# Patient Record
Sex: FEMALE | Race: OTHER | HISPANIC OR LATINO | URBAN - METROPOLITAN AREA
[De-identification: names, ages, dates, MRNs, and addresses within clinical notes are randomized per-mention and may not be internally consistent; named-entity substitution may affect disease eponyms.]

---

## 2018-03-12 ENCOUNTER — INPATIENT (INPATIENT)
Facility: HOSPITAL | Age: 83
LOS: 7 days | Discharge: HOME | DRG: 375 | End: 2018-03-20
Attending: INTERNAL MEDICINE | Admitting: INTERNAL MEDICINE
Payer: MEDICARE

## 2018-03-12 VITALS
OXYGEN SATURATION: 98 % | RESPIRATION RATE: 18 BRPM | HEART RATE: 96 BPM | TEMPERATURE: 98 F | DIASTOLIC BLOOD PRESSURE: 77 MMHG | HEIGHT: 55 IN | SYSTOLIC BLOOD PRESSURE: 144 MMHG | WEIGHT: 106.04 LBS

## 2018-03-12 DIAGNOSIS — R13.10 DYSPHAGIA, UNSPECIFIED: ICD-10-CM

## 2018-03-12 DIAGNOSIS — Z98.890 OTHER SPECIFIED POSTPROCEDURAL STATES: Chronic | ICD-10-CM

## 2018-03-12 LAB
ALBUMIN SERPL ELPH-MCNC: 4 G/DL — SIGNIFICANT CHANGE UP (ref 3.3–5)
ALP SERPL-CCNC: 65 U/L — SIGNIFICANT CHANGE UP (ref 40–120)
ALT FLD-CCNC: 17 U/L RC — SIGNIFICANT CHANGE UP (ref 10–45)
ANION GAP SERPL CALC-SCNC: 15 MMOL/L — SIGNIFICANT CHANGE UP (ref 5–17)
APTT BLD: 30.6 SEC — SIGNIFICANT CHANGE UP (ref 27.5–37.4)
AST SERPL-CCNC: 27 U/L — SIGNIFICANT CHANGE UP (ref 10–40)
BASOPHILS # BLD AUTO: 0.1 K/UL — SIGNIFICANT CHANGE UP (ref 0–0.2)
BASOPHILS NFR BLD AUTO: 0.8 % — SIGNIFICANT CHANGE UP (ref 0–2)
BILIRUB SERPL-MCNC: 0.7 MG/DL — SIGNIFICANT CHANGE UP (ref 0.2–1.2)
BLD GP AB SCN SERPL QL: NEGATIVE — SIGNIFICANT CHANGE UP
BUN SERPL-MCNC: 16 MG/DL — SIGNIFICANT CHANGE UP (ref 7–23)
CALCIUM SERPL-MCNC: 9.4 MG/DL — SIGNIFICANT CHANGE UP (ref 8.4–10.5)
CHLORIDE SERPL-SCNC: 105 MMOL/L — SIGNIFICANT CHANGE UP (ref 96–108)
CO2 SERPL-SCNC: 24 MMOL/L — SIGNIFICANT CHANGE UP (ref 22–31)
CREAT SERPL-MCNC: 0.71 MG/DL — SIGNIFICANT CHANGE UP (ref 0.5–1.3)
EOSINOPHIL # BLD AUTO: 0.1 K/UL — SIGNIFICANT CHANGE UP (ref 0–0.5)
EOSINOPHIL NFR BLD AUTO: 1.1 % — SIGNIFICANT CHANGE UP (ref 0–6)
GLUCOSE SERPL-MCNC: 90 MG/DL — SIGNIFICANT CHANGE UP (ref 70–99)
HCT VFR BLD CALC: 40.7 % — SIGNIFICANT CHANGE UP (ref 34.5–45)
HGB BLD-MCNC: 14.1 G/DL — SIGNIFICANT CHANGE UP (ref 11.5–15.5)
INR BLD: 1.06 RATIO — SIGNIFICANT CHANGE UP (ref 0.88–1.16)
LYMPHOCYTES # BLD AUTO: 1.5 K/UL — SIGNIFICANT CHANGE UP (ref 1–3.3)
LYMPHOCYTES # BLD AUTO: 17.9 % — SIGNIFICANT CHANGE UP (ref 13–44)
MCHC RBC-ENTMCNC: 32.8 PG — SIGNIFICANT CHANGE UP (ref 27–34)
MCHC RBC-ENTMCNC: 34.5 GM/DL — SIGNIFICANT CHANGE UP (ref 32–36)
MCV RBC AUTO: 95 FL — SIGNIFICANT CHANGE UP (ref 80–100)
MONOCYTES # BLD AUTO: 0.5 K/UL — SIGNIFICANT CHANGE UP (ref 0–0.9)
MONOCYTES NFR BLD AUTO: 5.9 % — SIGNIFICANT CHANGE UP (ref 2–14)
NEUTROPHILS # BLD AUTO: 6.2 K/UL — SIGNIFICANT CHANGE UP (ref 1.8–7.4)
NEUTROPHILS NFR BLD AUTO: 74.3 % — SIGNIFICANT CHANGE UP (ref 43–77)
PLATELET # BLD AUTO: 268 K/UL — SIGNIFICANT CHANGE UP (ref 150–400)
POTASSIUM SERPL-MCNC: 3.6 MMOL/L — SIGNIFICANT CHANGE UP (ref 3.5–5.3)
POTASSIUM SERPL-SCNC: 3.6 MMOL/L — SIGNIFICANT CHANGE UP (ref 3.5–5.3)
PROT SERPL-MCNC: 7.3 G/DL — SIGNIFICANT CHANGE UP (ref 6–8.3)
PROTHROM AB SERPL-ACNC: 11.6 SEC — SIGNIFICANT CHANGE UP (ref 9.8–12.7)
RBC # BLD: 4.29 M/UL — SIGNIFICANT CHANGE UP (ref 3.8–5.2)
RBC # FLD: 11.7 % — SIGNIFICANT CHANGE UP (ref 10.3–14.5)
RH IG SCN BLD-IMP: POSITIVE — SIGNIFICANT CHANGE UP
RH IG SCN BLD-IMP: POSITIVE — SIGNIFICANT CHANGE UP
SODIUM SERPL-SCNC: 144 MMOL/L — SIGNIFICANT CHANGE UP (ref 135–145)
WBC # BLD: 8.4 K/UL — SIGNIFICANT CHANGE UP (ref 3.8–10.5)
WBC # FLD AUTO: 8.4 K/UL — SIGNIFICANT CHANGE UP (ref 3.8–10.5)

## 2018-03-12 PROCEDURE — 99285 EMERGENCY DEPT VISIT HI MDM: CPT

## 2018-03-12 RX ORDER — HEPARIN SODIUM 5000 [USP'U]/ML
5000 INJECTION INTRAVENOUS; SUBCUTANEOUS EVERY 12 HOURS
Qty: 0 | Refills: 0 | Status: DISCONTINUED | OUTPATIENT
Start: 2018-03-12 | End: 2018-03-20

## 2018-03-12 RX ORDER — AMLODIPINE BESYLATE 2.5 MG/1
5 TABLET ORAL DAILY
Qty: 0 | Refills: 0 | Status: DISCONTINUED | OUTPATIENT
Start: 2018-03-12 | End: 2018-03-14

## 2018-03-12 RX ORDER — AMLODIPINE BESYLATE 2.5 MG/1
1 TABLET ORAL
Qty: 0 | Refills: 0 | COMMUNITY

## 2018-03-12 RX ORDER — AMLODIPINE BESYLATE AND BENAZEPRIL HYDROCHLORIDE 10; 20 MG/1; MG/1
1 CAPSULE ORAL
Qty: 0 | Refills: 0 | COMMUNITY

## 2018-03-12 RX ORDER — LOSARTAN POTASSIUM 100 MG/1
100 TABLET, FILM COATED ORAL DAILY
Qty: 0 | Refills: 0 | Status: DISCONTINUED | OUTPATIENT
Start: 2018-03-12 | End: 2018-03-20

## 2018-03-12 RX ORDER — AMLODIPINE BESYLATE 2.5 MG/1
0 TABLET ORAL
Qty: 0 | Refills: 0 | COMMUNITY

## 2018-03-12 RX ORDER — LEVOTHYROXINE SODIUM 125 MCG
1 TABLET ORAL
Qty: 0 | Refills: 0 | COMMUNITY

## 2018-03-12 RX ORDER — HYDROCHLOROTHIAZIDE 25 MG
12.5 TABLET ORAL DAILY
Qty: 0 | Refills: 0 | Status: DISCONTINUED | OUTPATIENT
Start: 2018-03-12 | End: 2018-03-20

## 2018-03-12 RX ORDER — LATANOPROST 0.05 MG/ML
1 SOLUTION/ DROPS OPHTHALMIC; TOPICAL
Qty: 0 | Refills: 0 | COMMUNITY

## 2018-03-12 RX ORDER — PANTOPRAZOLE SODIUM 20 MG/1
40 TABLET, DELAYED RELEASE ORAL DAILY
Qty: 0 | Refills: 0 | Status: DISCONTINUED | OUTPATIENT
Start: 2018-03-12 | End: 2018-03-17

## 2018-03-12 RX ORDER — OMEPRAZOLE 10 MG/1
1 CAPSULE, DELAYED RELEASE ORAL
Qty: 0 | Refills: 0 | COMMUNITY

## 2018-03-12 RX ORDER — LISINOPRIL 2.5 MG/1
20 TABLET ORAL DAILY
Qty: 0 | Refills: 0 | Status: DISCONTINUED | OUTPATIENT
Start: 2018-03-12 | End: 2018-03-14

## 2018-03-12 RX ORDER — LEVOTHYROXINE SODIUM 125 MCG
0 TABLET ORAL
Qty: 0 | Refills: 0 | COMMUNITY

## 2018-03-12 RX ORDER — LATANOPROST 0.05 MG/ML
1 SOLUTION/ DROPS OPHTHALMIC; TOPICAL AT BEDTIME
Qty: 0 | Refills: 0 | Status: DISCONTINUED | OUTPATIENT
Start: 2018-03-12 | End: 2018-03-20

## 2018-03-12 RX ORDER — LEVOTHYROXINE SODIUM 125 MCG
75 TABLET ORAL DAILY
Qty: 0 | Refills: 0 | Status: DISCONTINUED | OUTPATIENT
Start: 2018-03-12 | End: 2018-03-20

## 2018-03-12 NOTE — ED ADULT NURSE REASSESSMENT NOTE - NS ED NURSE REASSESS COMMENT FT1
Received report from previous shift RN. Patient resting comfortably in bed with family at bedside. Pt made aware of plan for admission and NPO status. Denies pain or discomfort at this time. VSS. Awaiting bed assignment

## 2018-03-12 NOTE — H&P ADULT - NSHPLABSRESULTS_GEN_ALL_CORE
LABS:                        14.1   8.4   )-----------( 268      ( 12 Mar 2018 15:25 )             40.7     03-12    144  |  105  |  16  ----------------------------<  90  3.6   |  24  |  0.71    Ca    9.4      12 Mar 2018 15:25    TPro  7.3  /  Alb  4.0  /  TBili  0.7  /  DBili  x   /  AST  27  /  ALT  17  /  AlkPhos  65  03-12    PT/INR - ( 12 Mar 2018 15:25 )   PT: 11.6 sec;   INR: 1.06 ratio         PTT - ( 12 Mar 2018 15:25 )  PTT:30.6 sec          RADIOLOGY & ADDITIONAL TESTS:

## 2018-03-12 NOTE — ED ADULT TRIAGE NOTE - CHIEF COMPLAINT QUOTE
oliva been having trouble swallowing for the last 3 weeks; unable to have an endoscopy; scope too big

## 2018-03-12 NOTE — CONSULT NOTE ADULT - ASSESSMENT
89 yo woman with h/o hypothyroid and GERD p/w 2 weeks of progressive dysphagia and 3/2/18 barium swallow at outside facility suggesting achalasia and no masses identified.     - No c/f obstruction with need for emergent EGD at this time.   - Will possibly need esophageal manometry  - Keep NPO pending further workup  - IVF while NPO    Will discuss with fellow and attending.      Chris Harmon MD  PGY-1 | Internal Medicine 87 yo woman with h/o hypothyroid and GERD p/w 2 weeks of progressive dysphagia and 3/2/18 barium swallow at outside facility suggesting achalasia and no masses identified.     - No c/f obstruction with need for emergent EGD at this time.   - Will possibly need esophageal manometry  - Keep NPO pending further workup  - IVF while NPO  - c/w PPI for GERD    Will discuss with fellow and attending.      Chris Harmon MD  PGY-1 | Internal Medicine 87 yo woman with h/o hypothyroid and GERD p/w 2 weeks of progressive dysphagia and 3/2/18 barium swallow at outside facility suggesting achalasia and no masses identified.     - No c/f obstruction with need for emergent EGD at this time.   - Plan for EGD tomorrow  - NPO at midnight, diet as tolerated until then  - c/w PPI     Will discuss with fellow and attending.      Chris Harmon MD  PGY-1 | Internal Medicine 89 yo woman with h/o hypothyroid and GERD p/w 2 weeks of progressive dysphagia and 3/2/18 barium swallow at outside facility suggesting achalasia and no masses identified.     - No c/f obstruction with need for emergent EGD at this time.   - Plan for EGD tomorrow  - please keep patient NPO   - c/w PPI     Will discuss with fellow and attending.      Chris Harmon MD  PGY-1 | Internal Medicine

## 2018-03-12 NOTE — ED PROVIDER NOTE - ATTENDING CONTRIBUTION TO CARE
Patient presenting with progressive dysphagia over past three weeks, now unable to tolerate PO, 10lb weight loss in last week, saw outpatient GI who had outpatient endoscopy with esophageal narrowing and unable to pass scope, sent by GI for admission for further care, no other complaints, no associated pains.    On exam patient well appearing, vital signs within normal limits, RRR S1/S2, lungs clear to ascultation bilaterally, abdomen soft, non tender, non distended.    Discussed with her GI, already contacted house GI service, patient with progressive dysphagia and outpatient esophageal narrowing, ? achalasia versus stricture with inability to tolerated PO, plan for labs, IVF, GI consultation, admission for further management.

## 2018-03-12 NOTE — ED ADULT NURSE NOTE - OBJECTIVE STATEMENT
89 y/o female  sent in by her gastroenterologist for ongoing difficulty swallowing and failure to thrive due to "narrowing" of the esophagus.  Pt states  EGD not obtained due to severe narrowing.  Pt reports she lost about 10 pounds of unintentional weight loss over the last 1 month.  Pt states she is able to swallow solids and liquids but often "choking" with non bloody phlegm.  Pt denies fever, chills, chest pain, SOB, nausea, vomiting.

## 2018-03-12 NOTE — ED PROVIDER NOTE - OBJECTIVE STATEMENT
89 yo F sent in by her gastroenterologist (Dr. Waldrop) for ongoing difficulty swallowing and failure to thrive secondary to "narrowing" of the esophagus. Full EGD was unable to be obtained secondary to severity of narrowing. Pt reports about 10 pounds of unintentional weight loss over the last 1 month. Able to swallow solids and liquids but often "choking" with copious amount of non bloody phlegm and does not believe she is able to adequately eat or hydrate.

## 2018-03-12 NOTE — CONSULT NOTE ADULT - SUBJECTIVE AND OBJECTIVE BOX
PATIENT: MARTINA MOON   AGE: 87yo   GENDER: Female  WEIGHT: Height (cm): 139.7 (03-12-18 @ 13:24)  Weight (kg): 48.1 (03-12-18 @ 13:24)  BMI (kg/m2): 24.6 (03-12-18 @ 13:24)  BSA (m2): 1.34 (03-12-18 @ 13:24)  ALLERGIES: No Known Allergies    CHIEF COMPLAINT:   Dysphagia    GI CONSULT NOTE:   89 yo woman with h/o hypothyroid and GERD p/w 2 weeks of progressive dysphagia. She reports dysphagia began with solids a/w epigastric pain and has progressed to liquids. She denies previous episodes or h/o GI dz. She denies recent F/C, N/V, SOB, CP not a/w eating, abd pain, diarrhea, hematochezia, melena. 3/2/18 barium swallow at outside facility was significant for delayed emptying of the esophagus with smooth tapering distally, suggesting achalasia; no masses identified.      MEDICATIONS      VITAL SIGNS (last 24 hrs):  ICU Vital Signs Last 24 Hrs  T(C): 36.9 (12 Mar 2018 13:24), Max: 36.9 (12 Mar 2018 13:24)  T(F): 98.5 (12 Mar 2018 13:24), Max: 98.5 (12 Mar 2018 13:24)  HR: 95 (12 Mar 2018 14:10) (95 - 96)  BP: 130/64 (12 Mar 2018 14:10) (130/64 - 144/77)  BP(mean): --  ABP: --  ABP(mean): --  RR: 18 (12 Mar 2018 14:10) (18 - 18)  SpO2: 97% (12 Mar 2018 14:10) (97% - 98%)    T(C): 36.9 (03-12-18 @ 13:24), Max: 36.9 (03-12-18 @ 13:24)  T(F): 98.5 (03-12-18 @ 13:24), Max: 98.5 (03-12-18 @ 13:24)  HR: 95 (03-12-18 @ 14:10) (95 - 96)  BP: 130/64 (03-12-18 @ 14:10) (130/64 - 144/77)  BP(mean): --  RR: 18 (03-12-18 @ 14:10) (18 - 18)  SpO2: 97% (03-12-18 @ 14:10) (97% - 98%)    PHYSICAL EXAM:  GENERAL: NAD  HEENT:  No oropharyngeal erythema or exudates  NECK: Supple, non tender, no masses appreciated  CHEST/LUNG: CTAB, no wheezes, ronchi, rales  HEART: Normal rate, regular rhythm; No murmurs, rubs, or gallops; No JVD or peripheral edema  ABDOMEN: soft, NTTP, nondistended, normoactive BS  MSK/EXTREMITIES:  Grossly normal strength, movement, tone, and ROM; Palpable peripheral pulses; No clubbing or cyanosis  PSYCH: AAOx4  NEUROLOGY: Non focal   SKIN: No rashes or lesions      LABS:                         14.1   8.4   >-----< 268           ( 03-12-18 @ 15:25 )             40.7                    I/O SUMMARY:      MICROBIOLOGY:      RADIOLOGY & ADDITIONAL TESTS: PATIENT: MARTINA MOON   AGE: 87yo   GENDER: Female  WEIGHT: Height (cm): 139.7 (03-12-18 @ 13:24)  Weight (kg): 48.1 (03-12-18 @ 13:24)  BMI (kg/m2): 24.6 (03-12-18 @ 13:24)  BSA (m2): 1.34 (03-12-18 @ 13:24)  ALLERGIES: No Known Allergies    CHIEF COMPLAINT:   Dysphagia    GI CONSULT NOTE:   87 yo woman with h/o HTN, hypothyroid, and GERD p/w 2 weeks of progressive dysphagia. She reports dysphagia began with solids a/w epigastric pain and has progressed to liquids. She denies previous episodes or h/o GI dz. She denies recent F/C, N/V, SOB, CP not a/w eating, abd pain, diarrhea, hematochezia, melena. 3/2/18 barium swallow at outside facility was significant for delayed emptying of the esophagus with smooth tapering distally, suggesting achalasia; no masses identified.      MEDICATIONS      VITAL SIGNS (last 24 hrs):  ICU Vital Signs Last 24 Hrs  T(C): 36.9 (12 Mar 2018 13:24), Max: 36.9 (12 Mar 2018 13:24)  T(F): 98.5 (12 Mar 2018 13:24), Max: 98.5 (12 Mar 2018 13:24)  HR: 95 (12 Mar 2018 14:10) (95 - 96)  BP: 130/64 (12 Mar 2018 14:10) (130/64 - 144/77)  BP(mean): --  ABP: --  ABP(mean): --  RR: 18 (12 Mar 2018 14:10) (18 - 18)  SpO2: 97% (12 Mar 2018 14:10) (97% - 98%)    T(C): 36.9 (03-12-18 @ 13:24), Max: 36.9 (03-12-18 @ 13:24)  T(F): 98.5 (03-12-18 @ 13:24), Max: 98.5 (03-12-18 @ 13:24)  HR: 95 (03-12-18 @ 14:10) (95 - 96)  BP: 130/64 (03-12-18 @ 14:10) (130/64 - 144/77)  BP(mean): --  RR: 18 (03-12-18 @ 14:10) (18 - 18)  SpO2: 97% (03-12-18 @ 14:10) (97% - 98%)    PHYSICAL EXAM:  GENERAL: NAD  HEENT:  No oropharyngeal erythema or exudates  NECK: Supple, non tender, no masses appreciated  CHEST/LUNG: CTAB, no wheezes, ronchi, rales  HEART: Normal rate, regular rhythm; No murmurs, rubs, or gallops; No JVD or peripheral edema  ABDOMEN: soft, NTTP, nondistended, normoactive BS  MSK/EXTREMITIES:  Grossly normal strength, movement, tone, and ROM; Palpable peripheral pulses; No clubbing or cyanosis  PSYCH: AAOx4  NEUROLOGY: Non focal   SKIN: No rashes or lesions      LABS:                         14.1   8.4   >-----< 268           ( 03-12-18 @ 15:25 )             40.7                    I/O SUMMARY:      MICROBIOLOGY:      RADIOLOGY & ADDITIONAL TESTS: PATIENT: MARTINA MOON   AGE: 87yo   GENDER: Female  WEIGHT: Height (cm): 139.7 (03-12-18 @ 13:24)  Weight (kg): 48.1 (03-12-18 @ 13:24)  BMI (kg/m2): 24.6 (03-12-18 @ 13:24)  BSA (m2): 1.34 (03-12-18 @ 13:24)  ALLERGIES: No Known Allergies    CHIEF COMPLAINT:   Dysphagia    GI CONSULT NOTE:   87 yo woman with h/o HTN, hypothyroid, and GERD p/w 2 weeks of progressive dysphagia. She reports dysphagia began with solids a/w epigastric pain and has progressed to liquids. She denies saliva accumulation or aspirating. She also denies previous episodes or h/o GI dz. She denies recent F/C, N/V, SOB, CP not a/w eating, abd pain, diarrhea, hematochezia, melena. 3/2/18 barium swallow at outside facility was significant for delayed emptying of the esophagus with smooth tapering distally, suggesting achalasia; no masses identified.      MEDICATIONS      VITAL SIGNS (last 24 hrs):  ICU Vital Signs Last 24 Hrs  T(C): 36.9 (12 Mar 2018 13:24), Max: 36.9 (12 Mar 2018 13:24)  T(F): 98.5 (12 Mar 2018 13:24), Max: 98.5 (12 Mar 2018 13:24)  HR: 95 (12 Mar 2018 14:10) (95 - 96)  BP: 130/64 (12 Mar 2018 14:10) (130/64 - 144/77)  BP(mean): --  ABP: --  ABP(mean): --  RR: 18 (12 Mar 2018 14:10) (18 - 18)  SpO2: 97% (12 Mar 2018 14:10) (97% - 98%)    T(C): 36.9 (03-12-18 @ 13:24), Max: 36.9 (03-12-18 @ 13:24)  T(F): 98.5 (03-12-18 @ 13:24), Max: 98.5 (03-12-18 @ 13:24)  HR: 95 (03-12-18 @ 14:10) (95 - 96)  BP: 130/64 (03-12-18 @ 14:10) (130/64 - 144/77)  BP(mean): --  RR: 18 (03-12-18 @ 14:10) (18 - 18)  SpO2: 97% (03-12-18 @ 14:10) (97% - 98%)    PHYSICAL EXAM:  GENERAL: NAD  HEENT:  No oropharyngeal erythema or exudates  NECK: Supple, non tender, no masses appreciated  CHEST/LUNG: CTAB, no wheezes, ronchi, rales  HEART: Normal rate, regular rhythm; No murmurs, rubs, or gallops; No JVD or peripheral edema  ABDOMEN: soft, NTTP, nondistended, normoactive BS  MSK/EXTREMITIES:  Grossly normal strength, movement, tone, and ROM; Palpable peripheral pulses; No clubbing or cyanosis  PSYCH: AAOx4  NEUROLOGY: Non focal   SKIN: No rashes or lesions      LABS:                         14.1   8.4   >-----< 268           ( 03-12-18 @ 15:25 )             40.7                    I/O SUMMARY:      MICROBIOLOGY:      RADIOLOGY & ADDITIONAL TESTS:

## 2018-03-12 NOTE — H&P ADULT - ASSESSMENT
89 yo F sent in by her gastroenterologist (Dr. Waldrop) for ongoing difficulty swallowing and failure to thrive secondary to "narrowing" of the esophagus. Full EGD was unable to be obtained secondary to severity of narrowing. Pt reports about 10 pounds of unintentional weight loss over the last 1 month. Able to swallow solids and liquids but often "choking" with copious amount of non bloody phlegm and does not believe she is able to adequately eat or hydrate. 87 yo F sent in by her gastroenterologist (Dr. Waldrop) for ongoing difficulty swallowing and failure to thrive secondary to "narrowing" of the esophagus. Full EGD was unable to be obtained secondary to severity of narrowing. Pt reports about 10 pounds of unintentional weight loss over the last 1 month. Able to swallow solids and liquids but often "choking" with copious amount of non bloody phlegm and does not believe she is able to adequately eat or hydrate.    dysphagia with esophageal tricture - keep npo, iv ppi, gi consult for egd    htn - c/w home meds    hypothyroid - c/w synthroid , check tsh

## 2018-03-13 ENCOUNTER — RESULT REVIEW (OUTPATIENT)
Age: 83
End: 2018-03-13

## 2018-03-13 PROBLEM — Z00.00 ENCOUNTER FOR PREVENTIVE HEALTH EXAMINATION: Status: ACTIVE | Noted: 2018-03-13

## 2018-03-13 LAB
ANION GAP SERPL CALC-SCNC: 16 MMOL/L — SIGNIFICANT CHANGE UP (ref 5–17)
BUN SERPL-MCNC: 14 MG/DL — SIGNIFICANT CHANGE UP (ref 7–23)
CALCIUM SERPL-MCNC: 8.9 MG/DL — SIGNIFICANT CHANGE UP (ref 8.4–10.5)
CHLORIDE SERPL-SCNC: 105 MMOL/L — SIGNIFICANT CHANGE UP (ref 96–108)
CO2 SERPL-SCNC: 22 MMOL/L — SIGNIFICANT CHANGE UP (ref 22–31)
CREAT SERPL-MCNC: 0.61 MG/DL — SIGNIFICANT CHANGE UP (ref 0.5–1.3)
FOLATE SERPL-MCNC: >20 NG/ML — SIGNIFICANT CHANGE UP (ref 4.8–24.2)
GLUCOSE BLDC GLUCOMTR-MCNC: 114 MG/DL — HIGH (ref 70–99)
GLUCOSE BLDC GLUCOMTR-MCNC: 228 MG/DL — HIGH (ref 70–99)
GLUCOSE BLDC GLUCOMTR-MCNC: 59 MG/DL — LOW (ref 70–99)
GLUCOSE BLDC GLUCOMTR-MCNC: 65 MG/DL — LOW (ref 70–99)
GLUCOSE BLDC GLUCOMTR-MCNC: 68 MG/DL — LOW (ref 70–99)
GLUCOSE BLDC GLUCOMTR-MCNC: 70 MG/DL — SIGNIFICANT CHANGE UP (ref 70–99)
GLUCOSE SERPL-MCNC: 66 MG/DL — LOW (ref 70–99)
HCT VFR BLD CALC: 38.1 % — SIGNIFICANT CHANGE UP (ref 34.5–45)
HGB BLD-MCNC: 12.9 G/DL — SIGNIFICANT CHANGE UP (ref 11.5–15.5)
MAGNESIUM SERPL-MCNC: 1.6 MG/DL — SIGNIFICANT CHANGE UP (ref 1.6–2.6)
MCHC RBC-ENTMCNC: 31.5 PG — SIGNIFICANT CHANGE UP (ref 27–34)
MCHC RBC-ENTMCNC: 33.9 GM/DL — SIGNIFICANT CHANGE UP (ref 32–36)
MCV RBC AUTO: 93.2 FL — SIGNIFICANT CHANGE UP (ref 80–100)
NRBC # BLD: 0 /100 WBCS — SIGNIFICANT CHANGE UP (ref 0–0)
PHOSPHATE SERPL-MCNC: 2.7 MG/DL — SIGNIFICANT CHANGE UP (ref 2.5–4.5)
PLATELET # BLD AUTO: 261 K/UL — SIGNIFICANT CHANGE UP (ref 150–400)
POTASSIUM SERPL-MCNC: 3.4 MMOL/L — LOW (ref 3.5–5.3)
POTASSIUM SERPL-SCNC: 3.4 MMOL/L — LOW (ref 3.5–5.3)
RBC # BLD: 4.09 M/UL — SIGNIFICANT CHANGE UP (ref 3.8–5.2)
RBC # FLD: 13.7 % — SIGNIFICANT CHANGE UP (ref 10.3–14.5)
SODIUM SERPL-SCNC: 143 MMOL/L — SIGNIFICANT CHANGE UP (ref 135–145)
TSH SERPL-MCNC: 0.63 UIU/ML — SIGNIFICANT CHANGE UP (ref 0.27–4.2)
VIT B12 SERPL-MCNC: 915 PG/ML — SIGNIFICANT CHANGE UP (ref 232–1245)
WBC # BLD: 7.07 K/UL — SIGNIFICANT CHANGE UP (ref 3.8–10.5)
WBC # FLD AUTO: 7.07 K/UL — SIGNIFICANT CHANGE UP (ref 3.8–10.5)

## 2018-03-13 PROCEDURE — 43239 EGD BIOPSY SINGLE/MULTIPLE: CPT | Mod: GC

## 2018-03-13 PROCEDURE — 88312 SPECIAL STAINS GROUP 1: CPT | Mod: 26

## 2018-03-13 PROCEDURE — 71045 X-RAY EXAM CHEST 1 VIEW: CPT | Mod: 26

## 2018-03-13 PROCEDURE — 88360 TUMOR IMMUNOHISTOCHEM/MANUAL: CPT | Mod: 26

## 2018-03-13 PROCEDURE — 88305 TISSUE EXAM BY PATHOLOGIST: CPT | Mod: 26

## 2018-03-13 RX ORDER — DEXTROSE 50 % IN WATER 50 %
25 SYRINGE (ML) INTRAVENOUS ONCE
Qty: 0 | Refills: 0 | Status: COMPLETED | OUTPATIENT
Start: 2018-03-13 | End: 2018-03-13

## 2018-03-13 RX ORDER — POTASSIUM CHLORIDE 20 MEQ
10 PACKET (EA) ORAL ONCE
Qty: 0 | Refills: 0 | Status: COMPLETED | OUTPATIENT
Start: 2018-03-13 | End: 2018-03-13

## 2018-03-13 RX ORDER — POTASSIUM CHLORIDE 20 MEQ
10 PACKET (EA) ORAL ONCE
Qty: 0 | Refills: 0 | Status: DISCONTINUED | OUTPATIENT
Start: 2018-03-13 | End: 2018-03-13

## 2018-03-13 RX ORDER — SODIUM CHLORIDE 9 MG/ML
1000 INJECTION INTRAMUSCULAR; INTRAVENOUS; SUBCUTANEOUS
Qty: 0 | Refills: 0 | Status: DISCONTINUED | OUTPATIENT
Start: 2018-03-13 | End: 2018-03-15

## 2018-03-13 RX ORDER — SODIUM CHLORIDE 9 MG/ML
1000 INJECTION, SOLUTION INTRAVENOUS
Qty: 0 | Refills: 0 | Status: DISCONTINUED | OUTPATIENT
Start: 2018-03-13 | End: 2018-03-13

## 2018-03-13 RX ADMIN — SODIUM CHLORIDE 75 MILLILITER(S): 9 INJECTION, SOLUTION INTRAVENOUS at 07:04

## 2018-03-13 RX ADMIN — PANTOPRAZOLE SODIUM 40 MILLIGRAM(S): 20 TABLET, DELAYED RELEASE ORAL at 14:35

## 2018-03-13 RX ADMIN — LOSARTAN POTASSIUM 100 MILLIGRAM(S): 100 TABLET, FILM COATED ORAL at 05:37

## 2018-03-13 RX ADMIN — Medication 25 GRAM(S): at 07:04

## 2018-03-13 RX ADMIN — Medication 75 MICROGRAM(S): at 05:37

## 2018-03-13 RX ADMIN — SODIUM CHLORIDE 75 MILLILITER(S): 9 INJECTION INTRAMUSCULAR; INTRAVENOUS; SUBCUTANEOUS at 21:37

## 2018-03-13 RX ADMIN — Medication 100 MILLIEQUIVALENT(S): at 11:54

## 2018-03-13 RX ADMIN — LATANOPROST 1 DROP(S): 0.05 SOLUTION/ DROPS OPHTHALMIC; TOPICAL at 21:44

## 2018-03-13 RX ADMIN — SODIUM CHLORIDE 75 MILLILITER(S): 9 INJECTION INTRAMUSCULAR; INTRAVENOUS; SUBCUTANEOUS at 11:11

## 2018-03-13 RX ADMIN — HEPARIN SODIUM 5000 UNIT(S): 5000 INJECTION INTRAVENOUS; SUBCUTANEOUS at 05:37

## 2018-03-13 NOTE — CHART NOTE - NSCHARTNOTEFT_GEN_A_CORE
Case discussed with Dr. Bsahir regarding pt's nutritional status, & results of Endoscopy.   Pt S/P UGI Endoscopy.  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 Lumen is                        approximately 5-6 mm in diameter.                       - 6-7cm long segment of congested, erythematous and nodular mucosa in the                        cardia, gastric fundus and proximal gastric body. Appearance is suspicious                        for linitus plastica or lymphoma. Biopsied.                       - Duodenal diverticulum.  Recommendation:      - [facility].                       - Await pathology results.                       - Obtain a CT of the chest abdomen and pelvis with IV contrast for staging                        purposes                       - The patient states she can tolerate liquids, careful trial of liquid diet                        with high calorie supplements. Nutrition evaluation.                       - Discussed with Dr. Bashir.  PLAN:  >Start full liquid diet  >Ensure 1 can 3X/day  >CT Chest/Abd/Pelvis +/- IV contrast ordered to be done in AM  >Will endorse to day team    Akila Gtz PA-C  #[id number]

## 2018-03-13 NOTE — PROGRESS NOTE ADULT - ASSESSMENT
89 yo F sent in by her gastroenterologist (Dr. Waldrop) for ongoing difficulty swallowing and failure to thrive secondary to "narrowing" of the esophagus. Full EGD was unable to be obtained secondary to severity of narrowing. Pt reports about 10 pounds of unintentional weight loss over the last 1 month. Able to swallow solids and liquids but often "choking" with copious amount of non bloody phlegm and does not believe she is able to adequately eat or hydrate.    dysphagia with esophageal stricture - keep npo, iv ppi, gi consult for egd today    htn - c/w home meds    hypothyroid - c/w synthroid , check tsh

## 2018-03-13 NOTE — PROGRESS NOTE ADULT - SUBJECTIVE AND OBJECTIVE BOX
Patient is a 88y old  Female who presents with a chief complaint of unable to swallow (12 Mar 2018 20:56)      SUBJECTIVE / OVERNIGHT EVENTS:  No chest pain. No shortness of breath. No complaints. No events overnight. scheduled for endoscopy today.  i spoke with gi attg and fellow.    MEDICATIONS  (STANDING):  amLODIPine   Tablet 5 milliGRAM(s) Oral daily  amLODIPine   Tablet 5 milliGRAM(s) Oral daily  heparin  Injectable 5000 Unit(s) SubCutaneous every 12 hours  hydrochlorothiazide 12.5 milliGRAM(s) Oral daily  latanoprost 0.005% Ophthalmic Solution 1 Drop(s) Both EYES at bedtime  levothyroxine 75 MICROGram(s) Oral daily  lisinopril 20 milliGRAM(s) Oral daily  losartan 100 milliGRAM(s) Oral daily  pantoprazole  Injectable 40 milliGRAM(s) IV Push daily  sodium chloride 0.9%. 1000 milliLiter(s) (75 mL/Hr) IV Continuous <Continuous>    MEDICATIONS  (PRN):      Vital Signs Last 24 Hrs  T(C): 36.3 (13 Mar 2018 15:34), Max: 37.1 (12 Mar 2018 16:30)  T(F): 97.4 (13 Mar 2018 15:34), Max: 98.7 (12 Mar 2018 16:30)  HR: 81 (13 Mar 2018 15:34) (73 - 96)  BP: 105/61 (13 Mar 2018 15:34) (105/61 - 139/75)  BP(mean): --  RR: 17 (13 Mar 2018 15:34) (16 - 18)  SpO2: 99% (13 Mar 2018 15:34) (95% - 99%)  CAPILLARY BLOOD GLUCOSE      POCT Blood Glucose.: 228 mg/dL (13 Mar 2018 07:42)  POCT Blood Glucose.: 59 mg/dL (13 Mar 2018 06:51)    I&O's Summary      PHYSICAL EXAM:  GENERAL: NAD, well-developed  HEAD:  Atraumatic, Normocephalic  EYES: EOMI, PERRLA, conjunctiva and sclera clear  NECK: Supple, No JVD  CHEST/LUNG: Clear to auscultation bilaterally; No wheeze  HEART: Regular rate and rhythm; No murmurs, rubs, or gallops  ABDOMEN: Soft, Nontender, Nondistended; Bowel sounds present  EXTREMITIES:  2+ Peripheral Pulses, No clubbing, cyanosis, or edema  PSYCH: AAOx3  NEUROLOGY: non-focal  SKIN: No rashes or lesions    LABS:                        12.9   7.07  )-----------( 261      ( 13 Mar 2018 07:31 )             38.1     03-13    143  |  105  |  14  ----------------------------<  66<L>  3.4<L>   |  22  |  0.61    Ca    8.9      13 Mar 2018 05:36  Phos  2.7     03-13  Mg     1.6     03-13    TPro  7.3  /  Alb  4.0  /  TBili  0.7  /  DBili  x   /  AST  27  /  ALT  17  /  AlkPhos  65  03-12    PT/INR - ( 12 Mar 2018 15:25 )   PT: 11.6 sec;   INR: 1.06 ratio         PTT - ( 12 Mar 2018 15:25 )  PTT:30.6 sec          RADIOLOGY & ADDITIONAL TESTS:    Imaging Personally Reviewed:    Consultant(s) Notes Reviewed:      Care Discussed with Consultants/Other Providers:

## 2018-03-14 LAB
GLUCOSE BLDC GLUCOMTR-MCNC: 114 MG/DL — HIGH (ref 70–99)
GLUCOSE BLDC GLUCOMTR-MCNC: 74 MG/DL — SIGNIFICANT CHANGE UP (ref 70–99)
GLUCOSE BLDC GLUCOMTR-MCNC: 98 MG/DL — SIGNIFICANT CHANGE UP (ref 70–99)
SURGICAL PATHOLOGY STUDY: SIGNIFICANT CHANGE UP

## 2018-03-14 PROCEDURE — 71260 CT THORAX DX C+: CPT | Mod: 26

## 2018-03-14 PROCEDURE — 99232 SBSQ HOSP IP/OBS MODERATE 35: CPT | Mod: GC

## 2018-03-14 PROCEDURE — 74177 CT ABD & PELVIS W/CONTRAST: CPT | Mod: 26

## 2018-03-14 RX ORDER — LISINOPRIL 2.5 MG/1
20 TABLET ORAL AT BEDTIME
Qty: 0 | Refills: 0 | Status: DISCONTINUED | OUTPATIENT
Start: 2018-03-14 | End: 2018-03-20

## 2018-03-14 RX ORDER — AMLODIPINE BESYLATE 2.5 MG/1
10 TABLET ORAL AT BEDTIME
Qty: 0 | Refills: 0 | Status: DISCONTINUED | OUTPATIENT
Start: 2018-03-14 | End: 2018-03-20

## 2018-03-14 RX ORDER — AMLODIPINE BESYLATE 2.5 MG/1
10 TABLET ORAL DAILY
Qty: 0 | Refills: 0 | Status: DISCONTINUED | OUTPATIENT
Start: 2018-03-14 | End: 2018-03-14

## 2018-03-14 RX ADMIN — AMLODIPINE BESYLATE 10 MILLIGRAM(S): 2.5 TABLET ORAL at 22:28

## 2018-03-14 RX ADMIN — LATANOPROST 1 DROP(S): 0.05 SOLUTION/ DROPS OPHTHALMIC; TOPICAL at 21:37

## 2018-03-14 RX ADMIN — LISINOPRIL 20 MILLIGRAM(S): 2.5 TABLET ORAL at 22:29

## 2018-03-14 RX ADMIN — HEPARIN SODIUM 5000 UNIT(S): 5000 INJECTION INTRAVENOUS; SUBCUTANEOUS at 21:37

## 2018-03-14 RX ADMIN — Medication 75 MICROGRAM(S): at 05:19

## 2018-03-14 RX ADMIN — HEPARIN SODIUM 5000 UNIT(S): 5000 INJECTION INTRAVENOUS; SUBCUTANEOUS at 05:19

## 2018-03-14 NOTE — PROGRESS NOTE ADULT - ASSESSMENT
87 yo woman with h/o hypothyroid and GERD p/w 2 weeks of progressive dysphagia and 3/2/18 barium swallow at outside facility suggesting achalasia and no masses identified.     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   ·	Lumen is approximately 5-6 mm in diameter.  ·	6-7cm long segment of congested, erythematous and nodular mucosa in the cardia, gastric fundus and proximal gastric body. Appearance is suspicious for linitus plastica or lymphoma. Biopsied.  - f/u pathology results.  - f/u CT chest, A/P with IV contrast for staging   - Likely esophageal stent placement pending pathology results.  - Pt tolerating liquids, careful trial of liquid diet with high calorie supplements. Nutrition evaluation.  - c/w PPI     Discussed with fellow and attending.      Chris Harmon MD  PGY-1 | Internal Medicine

## 2018-03-14 NOTE — PROGRESS NOTE ADULT - ATTENDING COMMENTS
As above.  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  Endoscopic appearance consistent with linitus plastica.  Await CT scan results.    Recommendations:  Medical oncology consult  Will likely benefit from upper endoscopy with stent placement, tentatively planned for 3/16/18 if medical oncology and patient in agreement.

## 2018-03-14 NOTE — PROGRESS NOTE ADULT - SUBJECTIVE AND OBJECTIVE BOX
Patient is a 88y old  Female who presents with a chief complaint of unable to swallow (12 Mar 2018 20:56)      SUBJECTIVE / OVERNIGHT EVENTS:  No chest pain. No shortness of breath. No complaints. No events overnight.     MEDICATIONS  (STANDING):  amLODIPine   Tablet 5 milliGRAM(s) Oral daily  heparin  Injectable 5000 Unit(s) SubCutaneous every 12 hours  hydrochlorothiazide 12.5 milliGRAM(s) Oral daily  latanoprost 0.005% Ophthalmic Solution 1 Drop(s) Both EYES at bedtime  levothyroxine 75 MICROGram(s) Oral daily  lisinopril 20 milliGRAM(s) Oral daily  losartan 100 milliGRAM(s) Oral daily  pantoprazole  Injectable 40 milliGRAM(s) IV Push daily  sodium chloride 0.9%. 1000 milliLiter(s) (75 mL/Hr) IV Continuous <Continuous>    MEDICATIONS  (PRN):      Vital Signs Last 24 Hrs  T(C): 36.6 (14 Mar 2018 09:06), Max: 36.6 (13 Mar 2018 19:30)  T(F): 97.8 (14 Mar 2018 09:06), Max: 97.8 (13 Mar 2018 19:30)  HR: 94 (14 Mar 2018 09:06) (81 - 94)  BP: 159/80 (14 Mar 2018 09:06) (105/61 - 159/80)  BP(mean): --  RR: 18 (14 Mar 2018 09:06) (16 - 18)  SpO2: 95% (14 Mar 2018 09:06) (95% - 99%)  CAPILLARY BLOOD GLUCOSE      POCT Blood Glucose.: 74 mg/dL (14 Mar 2018 08:20)  POCT Blood Glucose.: 114 mg/dL (13 Mar 2018 23:10)  POCT Blood Glucose.: 65 mg/dL (13 Mar 2018 22:38)  POCT Blood Glucose.: 70 mg/dL (13 Mar 2018 22:01)  POCT Blood Glucose.: 68 mg/dL (13 Mar 2018 22:00)    I&O's Summary    14 Mar 2018 07:01  -  14 Mar 2018 11:58  --------------------------------------------------------  IN: 240 mL / OUT: 0 mL / NET: 240 mL        PHYSICAL EXAM:  GENERAL: NAD, well-developed  HEAD:  Atraumatic, Normocephalic  EYES: EOMI, PERRLA, conjunctiva and sclera clear  NECK: Supple, No JVD  CHEST/LUNG: Clear to auscultation bilaterally; No wheeze  HEART: Regular rate and rhythm; No murmurs, rubs, or gallops  ABDOMEN: Soft, Nontender, Nondistended; Bowel sounds present  EXTREMITIES:  2+ Peripheral Pulses, No clubbing, cyanosis, or edema  PSYCH: AAOx3  NEUROLOGY: non-focal  SKIN: No rashes or lesions    LABS:                        12.9   7.07  )-----------( 261      ( 13 Mar 2018 07:31 )             38.1     03-13    143  |  105  |  14  ----------------------------<  66<L>  3.4<L>   |  22  |  0.61    Ca    8.9      13 Mar 2018 05:36  Phos  2.7     03-13  Mg     1.6     03-13    TPro  7.3  /  Alb  4.0  /  TBili  0.7  /  DBili  x   /  AST  27  /  ALT  17  /  AlkPhos  65  03-12    PT/INR - ( 12 Mar 2018 15:25 )   PT: 11.6 sec;   INR: 1.06 ratio         PTT - ( 12 Mar 2018 15:25 )  PTT:30.6 sec          RADIOLOGY & ADDITIONAL TESTS:    Imaging Personally Reviewed: < from: Upper Endoscopy (03.13.18 @ 16:28) >  Impression:          - Moderate-severe esophageal stenosis, 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 Lumen is                        approximately 5-6 mm in diameter.                       - 6-7cm long segment of congested, erythematous and nodular mucosa in the                        cardia, gastric fundus and proximal gastric body. Appearanceis suspicious                        for linitus plastica or lymphoma. Biopsied.                       - Duodenal diverticulum.  Recommendation:      - [facility].                       - Await pathology results.                       - Obtain a CT of the chest abdomen and pelvis with IV contrast for staging                        purposes                       - The patient states she can tolerate liquids, careful trial of liquid diet                        with high calorie supplements. Nutrition evaluation.    < end of copied text >      Consultant(s) Notes Reviewed:      Care Discussed with Consultants/Other Providers:

## 2018-03-14 NOTE — PROGRESS NOTE ADULT - ASSESSMENT
89 yo F sent in by her gastroenterologist (Dr. Waldrop) for ongoing difficulty swallowing and failure to thrive secondary to "narrowing" of the esophagus. Full EGD was unable to be obtained secondary to severity of narrowing. Pt reports about 10 pounds of unintentional weight loss over the last 1 month. Able to swallow solids and liquids but often "choking" with copious amount of non bloody phlegm and does not believe she is able to adequately eat or hydrate.    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   Lumen is approximately 5-6 mm in diameter.  6-7cm long segment of congested, erythematous and nodular mucosa in the cardia, gastric fundus and proximal gastric body. Appearance is suspicious for linitus plastica or lymphoma. Biopsied.  - f/u pathology results.  - f/u CT chest, A/P with IV contrast for staging   - Likely esophageal stent placement pending pathology results.  - Pt tolerating liquids, careful trial of liquid diet with high calorie supplements. Nutrition evaluation.      htn - c/w home meds    hypothyroid - c/w synthroid , normal  tsh

## 2018-03-15 DIAGNOSIS — C16.9 MALIGNANT NEOPLASM OF STOMACH, UNSPECIFIED: ICD-10-CM

## 2018-03-15 LAB
ANION GAP SERPL CALC-SCNC: 10 MMOL/L — SIGNIFICANT CHANGE UP (ref 5–17)
BUN SERPL-MCNC: 5 MG/DL — LOW (ref 7–23)
CALCIUM SERPL-MCNC: 9.1 MG/DL — SIGNIFICANT CHANGE UP (ref 8.4–10.5)
CHLORIDE SERPL-SCNC: 106 MMOL/L — SIGNIFICANT CHANGE UP (ref 96–108)
CO2 SERPL-SCNC: 26 MMOL/L — SIGNIFICANT CHANGE UP (ref 22–31)
CREAT SERPL-MCNC: 0.59 MG/DL — SIGNIFICANT CHANGE UP (ref 0.5–1.3)
GLUCOSE BLDC GLUCOMTR-MCNC: 108 MG/DL — HIGH (ref 70–99)
GLUCOSE BLDC GLUCOMTR-MCNC: 115 MG/DL — HIGH (ref 70–99)
GLUCOSE BLDC GLUCOMTR-MCNC: 121 MG/DL — HIGH (ref 70–99)
GLUCOSE BLDC GLUCOMTR-MCNC: 137 MG/DL — HIGH (ref 70–99)
GLUCOSE SERPL-MCNC: 125 MG/DL — HIGH (ref 70–99)
POTASSIUM SERPL-MCNC: 3.2 MMOL/L — LOW (ref 3.5–5.3)
POTASSIUM SERPL-MCNC: 4.7 MMOL/L — SIGNIFICANT CHANGE UP (ref 3.5–5.3)
POTASSIUM SERPL-SCNC: 3.2 MMOL/L — LOW (ref 3.5–5.3)
POTASSIUM SERPL-SCNC: 4.7 MMOL/L — SIGNIFICANT CHANGE UP (ref 3.5–5.3)
SODIUM SERPL-SCNC: 142 MMOL/L — SIGNIFICANT CHANGE UP (ref 135–145)

## 2018-03-15 PROCEDURE — 99232 SBSQ HOSP IP/OBS MODERATE 35: CPT | Mod: GC

## 2018-03-15 RX ORDER — POTASSIUM CHLORIDE 20 MEQ
40 PACKET (EA) ORAL EVERY 4 HOURS
Qty: 0 | Refills: 0 | Status: COMPLETED | OUTPATIENT
Start: 2018-03-15 | End: 2018-03-15

## 2018-03-15 RX ADMIN — HEPARIN SODIUM 5000 UNIT(S): 5000 INJECTION INTRAVENOUS; SUBCUTANEOUS at 08:26

## 2018-03-15 RX ADMIN — Medication 40 MILLIEQUIVALENT(S): at 12:17

## 2018-03-15 RX ADMIN — AMLODIPINE BESYLATE 10 MILLIGRAM(S): 2.5 TABLET ORAL at 22:08

## 2018-03-15 RX ADMIN — Medication 12.5 MILLIGRAM(S): at 05:38

## 2018-03-15 RX ADMIN — Medication 40 MILLIEQUIVALENT(S): at 10:07

## 2018-03-15 RX ADMIN — Medication 75 MICROGRAM(S): at 05:38

## 2018-03-15 RX ADMIN — LATANOPROST 1 DROP(S): 0.05 SOLUTION/ DROPS OPHTHALMIC; TOPICAL at 22:07

## 2018-03-15 RX ADMIN — LOSARTAN POTASSIUM 100 MILLIGRAM(S): 100 TABLET, FILM COATED ORAL at 05:38

## 2018-03-15 RX ADMIN — LISINOPRIL 20 MILLIGRAM(S): 2.5 TABLET ORAL at 22:07

## 2018-03-15 RX ADMIN — PANTOPRAZOLE SODIUM 40 MILLIGRAM(S): 20 TABLET, DELAYED RELEASE ORAL at 12:17

## 2018-03-15 NOTE — CONSULT NOTE ADULT - PROBLEM SELECTOR RECOMMENDATION 9
Detailed d/w patient   Reviewed pathology report and CT findings   Await further path w/up  based on imaging, likely unresectable gastric cancer  lung nodule could be metastasis or 2nd primary  Agree with plan for stent placement  Likely will benefit form chemotherapy or immunotherapy (based on path findings) as good PS and no significant co morbidities  Patient anxious about avoiding side effects of chemo - explained better supportive care now than when her mother/ treated for cancer  Ct home meds  Nutrition support  Patient raised option of no treatment - I explained palliative / hospice care is always an option and that final decision should wait all reports, stent procedure in am  and d/w niece in am  Unlikely surgical candidate  Plan for PET as outpatient  tumor markers ordered  D/w

## 2018-03-15 NOTE — PROGRESS NOTE ADULT - SUBJECTIVE AND OBJECTIVE BOX
Patient is a 88y old  Female who presents with a chief complaint of unable to swallow (12 Mar 2018 20:56)      SUBJECTIVE / OVERNIGHT EVENTS:  Pt tolerating liquids only  MEDICATIONS  (STANDING):  amLODIPine   Tablet 10 milliGRAM(s) Oral at bedtime  heparin  Injectable 5000 Unit(s) SubCutaneous every 12 hours  hydrochlorothiazide 12.5 milliGRAM(s) Oral daily  latanoprost 0.005% Ophthalmic Solution 1 Drop(s) Both EYES at bedtime  levothyroxine 75 MICROGram(s) Oral daily  lisinopril 20 milliGRAM(s) Oral at bedtime  losartan 100 milliGRAM(s) Oral daily  pantoprazole  Injectable 40 milliGRAM(s) IV Push daily  sodium chloride 0.9%. 1000 milliLiter(s) (75 mL/Hr) IV Continuous <Continuous>    MEDICATIONS  (PRN):              PHYSICAL EXAM:  GENERAL: NAD, well-developed  HEAD:  Atraumatic, Normocephalic  EYES: EOMI, PERRLA, conjunctiva and sclera anicteric  NECK: Supple, No JVD  CHEST/LUNG: Clear to auscultation bilaterally; No wheeze  HEART: Regular rate and rhythm; No murmurs, rubs, or gallops  ABDOMEN: Soft, Nontender, Nondistended; Bowel sounds present, no hepatosplenomegaly, no rebound or guarding  EXTREMITIES:  2+ Peripheral Pulses, No clubbing, cyanosis, or edema  PSYCH: AAOx3  NEUROLOGY: non-focal, no asterixis  SKIN: No rashes or lesion    LABS:    03-15    142  |  106  |  5<L>  ----------------------------<  125<H>  3.2<L>   |  26  |  0.59    Ca    9.1      15 Mar 2018 06:26                  RADIOLOGY & ADDITIONAL TESTS:    CT scan: pancreas and lung lesions

## 2018-03-15 NOTE — PROGRESS NOTE ADULT - ASSESSMENT
Impression:    1. 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

## 2018-03-15 NOTE — PROGRESS NOTE ADULT - PROBLEM SELECTOR PLAN 1
Detailed d/w patient   Reviewed pathology report and CT findings  Await further path w/up  based on imaging, likely unresectable gastric cancer  lung nodule could be metastasis or 2nd primary  Agree with plan for stent placement  Likely will benefit form chemotherapy or immunotherapy (based on path findings) as good PS and no signiicant co morbidities  Patient anxious about avoiding side effects of chemo - explained better supportive care now than when her mother/ treated for cancer  Ct home meds  Nutrition support  Patient raised option of no treatment - I explained palliative / hospice care is always an option and that final decision should wait all reports, stent procedure in am and further d/w with her family Detailed d/w patient - today joined by niece and niece's   Reviewed pathology report and CT findings again  Await further path w/up  based on imaging, likely unresectable gastric cancer  lung nodule could be metastasis or 2nd primary  Agree with plan for stent placement  Likely will benefit form chemotherapy or immunotherapy (based on path findings) as good PS and no significant co morbidities  Patient anxious about avoiding side effects of chemo - explained better supportive care now than when her mother/ treated for cancer  Ct home meds  Nutrition support  Patient raised option of no treatment - I explained palliative / hospice care is always an option and that final decision should wait all reports, stent procedure in am  multiple questions from patient and family answered

## 2018-03-15 NOTE — PROGRESS NOTE ADULT - SUBJECTIVE AND OBJECTIVE BOX
Patient is a 88y old  Female who presents with a chief complaint of unable to swallow (12 Mar 2018 20:56)      HPI:  87 yo F sent in by her gastroenterologist (Dr. Waldrop) for ongoing difficulty swallowing and failure to thrive secondary to "narrowing" of the esophagus. Full EGD was unable to be obtained secondary to severity of narrowing. Pt reports about 10 pounds of unintentional weight loss over the last 1 month. Able to swallow solids and liquids but often "choking" with copious amount of non bloody phlegm and does not believe she is able to adequately eat or hydrate. (12 Mar 2018 20:56)       ROS:  Negative except for:    PAST MEDICAL & SURGICAL HISTORY:  Hypertension  Hypothyroid  History of hysterectomy  History of tonsillectomy  History of appendectomy      SOCIAL HISTORY:    FAMILY HISTORY:  No pertinent family history in first degree relatives      MEDICATIONS  (STANDING):  amLODIPine   Tablet 10 milliGRAM(s) Oral at bedtime  heparin  Injectable 5000 Unit(s) SubCutaneous every 12 hours  hydrochlorothiazide 12.5 milliGRAM(s) Oral daily  latanoprost 0.005% Ophthalmic Solution 1 Drop(s) Both EYES at bedtime  levothyroxine 75 MICROGram(s) Oral daily  lisinopril 20 milliGRAM(s) Oral at bedtime  losartan 100 milliGRAM(s) Oral daily  pantoprazole  Injectable 40 milliGRAM(s) IV Push daily  potassium chloride   Solution 40 milliEquivalent(s) Oral every 4 hours    MEDICATIONS  (PRN):      Allergies    No Known Allergies    Intolerances        Vital Signs Last 24 Hrs  T(C): 37.1 (15 Mar 2018 07:39), Max: 37.1 (14 Mar 2018 13:05)  T(F): 98.7 (15 Mar 2018 07:39), Max: 98.8 (14 Mar 2018 13:05)  HR: 80 (15 Mar 2018 07:39) (80 - 98)  BP: 114/67 (15 Mar 2018 07:39) (114/67 - 157/76)  BP(mean): --  RR: 18 (15 Mar 2018 07:39) (18 - 18)  SpO2: 98% (15 Mar 2018 07:39) (96% - 99%)    REVIEW OF SYSTEMS:    CONSTITUTIONAL: No weakness, fevers or chills  EYES/ENT: No visual changes;  No vertigo or throat pain   NECK: No pain or stiffness  RESPIRATORY: No cough, wheezing, hemoptysis; No shortness of breath  CARDIOVASCULAR: No chest pain or palpitations  GASTROINTESTINAL: No abdominal or epigastric pain. No nausea, vomiting, or hematemesis; No diarrhea or constipation. No melena or hematochezia.  GENITOURINARY: No dysuria, frequency or hematuria  NEUROLOGICAL: No numbness or weakness  SKIN: No itching, burning, rashes, or lesions     PHYSICAL EXAM  General: adult in NAD  HEENT: clear oropharynx, anicteric sclera, pink conjunctiva  Neck: supple  CV: normal S1/S2 with no murmur rubs or gallops  Lungs: positive air movement b/l ant lungs,clear to auscultation, no wheezes, no rales  Abdomen: soft non-tender non-distended, no hepatosplenomegaly  Ext: no clubbing cyanosis or edema  Skin: no rashes and no petechiae  Neuro: alert and oriented X 4, no focal deficits      LABS:          Folate, Serum: >20.0 ng/mL (03-13 @ 07:29)  Vitamin B12, Serum: 915 pg/mL (03-13 @ 07:29)    03-15    142  |  106  |  5<L>  ----------------------------<  125<H>  3.2<L>   |  26  |  0.59    Ca    9.1      15 Mar 2018 06:26    < from: CT Abdomen and Pelvis w/ Oral Cont and w/ IV Cont (03.14.18 @ 17:42) >    EXAM:  CT CHEST IC                          EXAM:  CT ABDOMEN AND PELVIS OC IC                            PROCEDURE DATE:  03/14/2018            INTERPRETATION:  CLINICAL INFORMATION: Dysphasia, found to have severe   distal esophageal stricture. Rule out tumor.    COMPARISON: None.    PROCEDURE:       FINDINGS:    CHEST:     LUNGS AND LARGE AIRWAYS: Patent central airways. There is a 1.9 cm   partially groundglass spiculated lesion in the right upper lobe which is   nonspecific but suspicious for malignancy. There is a 6 mm nodule in the   left lower lobe on series 3 image 54. There is bilateral lower lobe   linear atelectasis.  PLEURA: No pleural effusion.  VESSELS: Aortic and coronary artery calcifications.  HEART: Heart size is normal. No pericardial effusion.  MEDIASTINUM AND KASSI: No lymphadenopathy.  CHEST WALL AND LOWER NECK: Enlarged and heterogeneously enhancing left   lobe of the thyroid gland suggestive of goiter.    ABDOMEN AND PELVIS:    LIVER: Within normal limits.  BILE DUCTS: Normal caliber.  GALLBLADDER: Within normal limits.  SPLEEN: Within normal limits.  PANCREAS: 1.4 cm peripherally enhancing lesion in the tail the pancreas.  ADRENALS: Left adrenal mass inseparable from infiltrating soft tissue   density contacting the anterior and left aspect of the aorta and the   posterior aspect of the gastroesophageal junction.  KIDNEYS/URETERS: Bilateral renal cysts and subcentimeter hypodense   lesions which are too small to characterize.     BLADDER: Within normal limits.  REPRODUCTIVE ORGANS: Status post hysterectomy.    BOWEL: There is diffuse thickening of the distal esophagus spanning   approximately 7 cm with soft tissue mass extending posteriorly from the   thickened gastroesophageal junction as described above. These findings   are suggestive of malignancy.  PERITONEUM: No ascites.  VESSELS:  Within normal limits.  RETROPERITONEUM: Soft tissue infiltration as above.  ABDOMINAL WALL: Within normal limits.  BONES: Bilateral infraspinatus calcific tendinosis. Marked scoliosis with   subluxations and degenerative changes.    IMPRESSION:   Thickened distal esophagus spanning approximately 7 mm with infiltrative   mass extending posteriorly from the gastroesophageal junction and   contacting the anterior and left lateral aspects of the aorta and   inseparable from a left adrenal mass. 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    1.9 cm spiculated right upper lobe nodule either representing a primary   malignancy or metastatic disease.    1.4 cm peripherally enhancing lesion within the tail the pancreas which   is also likely neoplastic.        < from: Upper Endoscopy (03.13.18 @ 16:28) >    [facility]  ____________________________________________________________________________________________________  Patient Name: Lesli Osman                     MRN: [id number]  Account Number: [id number]                     YOB: 1929  Room: Endoscopy Room 3                           Gender: Female  Attending MD: Mahin Phelan MD                    Procedure Date No Time: 3/13/2018  ____________________________________________________________________________________________________     Procedure:           Upper GI endoscopy  Indications:         Dysphagia x 2 week  Providers:           Mahin Phelan MD, Surinder Smith (Fellow)  Requesting Provider: SHAY BASHIR  Medicines:           Monitored Anesthesia Care  Complications:       No immediate complications.  ____________________________________________________________________________________________________  Procedure:           Pre-Anesthesia Assessment:                       - Monitored anesthesia care under the supervision of an anesthesiologist was                        determined to be medically necessary for this procedure based on review of                        the patient's medical history, medications, and prior anesthesia history.                       After obtaining informed consent, the endoscope was passed under direct                        vision. Throughout the procedure, the patient's blood pressure, pulse, and                        oxygen saturations were monitoredcontinuously. The Endoscope was introduced                        through the mouth, and advanced to the second part of duodenum. The Endoscope                        was introduced through the mouth, and advanced to the lower third of           esophagus. The upper GI endoscopy was accomplished without difficulty. The                        patient tolerated the procedure well.                                                                                                        Findings:       An area of stenosis was found 35 cm from the incisors at the GE junction. This could not be        traversed with a standard upper endoscope. A transnasal scope was used to traverse the        stenosis.       The exam of the esophagus wasotherwise normal.       Mucosal changes characterized by congestion, erythema and nodularity were found in the        cardia, in the gastric fundus and in the gastric body. The abnormal area spanned 35cm to        42cm. Biopsies were taken with a cold forceps for histology. Estimated blood loss: none.       The exam of the stomach was otherwise normal.       A diverticulum was found in the second part of the duodenum.       The exam of the duodenum was otherwise normal.                                                                              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 Lumen is                        approximately 5-6 mm in diameter.                       - 6-7cm long segment of congested, erythematous and nodular mucosa in the                        cardia, gastric fundus and proximal gastric body. Appearanceis suspicious                        for linitus plastica or lymphoma. Biopsied.                       - Duodenal diverticulum.  Recommendation:      - [facility]                       - Await pathology results.                       - Obtain a CT of the chest abdomen and pelvis with IV contrast for staging                        purposes                       - The patient states she can tolerate liquids, careful trial of liquid diet                        with high calorie supplements. Nutrition evaluation.                       - Discussed with Dr. Bashir.   Surgical Pathology Report (03.13.18 @ 17:04)    Surgical Pathology Report:   ACCESSION No:  [id number]    LESLI OSMAN  Surgical Final Report    Final Diagnosis  Proximal stomach, endoscopic biopsy:  - Invasive adenocarcinoma, poorly differentiated, see  comment  - Negative for H. pylori (Warthin-Starry stain)    Comment:  Immunohistochemical stain, HER2/ÁNGEL, is being performed and the  result will be reported as an addendum.    Complete Blood Count in AM (03.13.18 @ 07:31)    Nucleated RBC: 0 /100 WBCs    WBC Count: 7.07 K/uL    RBC Count: 4.09 M/uL    Hemoglobin: 12.9 g/dL    Hematocrit: 38.1 %    Mean Cell Volume: 93.2 fl    Mean Cell Hemoglobin: 31.5 pg    Mean Cell Hemoglobin Conc: 33.9 gm/dL    Red Cell Distrib Width: 13.7 %    Platelet Count - Automated: 261 K/uL    Vitamin B12, Serum (03.13.18 @ 07:29)    Vitamin B12, Serum: 915: Note: Reference Range Change on 12/18/2017. pg/mL Patient is a 88y old  Female who presents with a chief complaint of unable to swallow (12 Mar 2018 20:56)      HPI:  89 yo F sent in by her gastroenterologist (Dr. Waldrop) for ongoing difficulty swallowing and failure to thrive for last 3-4 weeks. Full EGD was unable to be obtained secondary to severity of narrowing. Pt reports about 10 pounds of unintentional weight loss over the last 1 month. Able to swallow solids and liquids but often "choking" with copious amount of non bloody phlegm and does not believe she is able to adequately eat or hydrate. (12 Mar 2018 20:56). Patient reports retrosternal discomfort. she had endoscopy - narrowing at GE junction and linitis picture - biopsy - poorly differentiated adenocarcinoma. CT CAP done - 1.9 cm RUL lung nodule, Left adrenal mass inseparable from infiltrating soft tissue   density contacting the anterior and left aspect of the aorta and the posterior aspect of the gastroesophageal junction, pancreatic tail nodule.  Patient has pssive smoking exposure -  was a heavy smoker - had larynx cancer.  No headaches, vison, gait c/o.    PAST MEDICAL & SURGICAL HISTORY:  Hypertension  Hypothyroid  History of hysterectomy  History of tonsillectomy  History of appendectomy      SOCIAL HISTORY:  lives with nephew, niece  remote h/o smoking in 20s  passive smoke exposure +  independent adls at home    FAMILY HISTORY:  brother - kidney cancer  sister - nasal cancer  mother vulvar cancer      MEDICATIONS  (STANDING):  amLODIPine   Tablet 10 milliGRAM(s) Oral at bedtime  heparin  Injectable 5000 Unit(s) SubCutaneous every 12 hours  hydrochlorothiazide 12.5 milliGRAM(s) Oral daily  latanoprost 0.005% Ophthalmic Solution 1 Drop(s) Both EYES at bedtime  levothyroxine 75 MICROGram(s) Oral daily  lisinopril 20 milliGRAM(s) Oral at bedtime  losartan 100 milliGRAM(s) Oral daily  pantoprazole  Injectable 40 milliGRAM(s) IV Push daily  potassium chloride   Solution 40 milliEquivalent(s) Oral every 4 hours    MEDICATIONS  (PRN):      Allergies    No Known Allergies    Intolerances    Vital Signs Last 24 Hrs  T(C): 37.1 (15 Mar 2018 07:39), Max: 37.1 (14 Mar 2018 13:05)  T(F): 98.7 (15 Mar 2018 07:39), Max: 98.8 (14 Mar 2018 13:05)  HR: 80 (15 Mar 2018 07:39) (80 - 98)  BP: 114/67 (15 Mar 2018 07:39) (114/67 - 157/76)  BP(mean): --  RR: 18 (15 Mar 2018 07:39) (18 - 18)  SpO2: 98% (15 Mar 2018 07:39) (96% - 99%)    REVIEW OF SYSTEMS:    CONSTITUTIONAL: No weakness, fevers or chills  EYES/ENT: No visual changes;  No vertigo or throat pain   NECK: No pain or stiffness  RESPIRATORY: No wheezing, hemoptysis; No shortness of breath.  cough +  CARDIOVASCULAR: No chest discomfort.  palpitations +  GASTROINTESTINAL: No abdominal or epigastric pain. No nausea, vomiting, or hematemesis; No diarrhea or constipation. No melena or hematochezia.  GENITOURINARY: No dysuria, frequency or hematuria  NEUROLOGICAL: No numbness or weakness  SKIN: No itching, burning, rashes, or lesions     PHYSICAL EXAM  General: adult in NAD  HEENT: clear oropharynx, anicteric sclera, pink conjunctiva  Neck: supple  CV: normal S1/S2 with no murmur rubs or gallops  Lungs: positive air movement b/l ant lungs,clear to auscultation, no wheezes, no rales  Abdomen: soft non-tender non-distended, no hepatosplenomegaly  Ext: no clubbing cyanosis or edema  Skin: no rashes and no petechiae  Neuro: alert and oriented X 4, no focal deficits      LABS:    Folate, Serum: >20.0 ng/mL (03-13 @ 07:29)  Vitamin B12, Serum: 915 pg/mL (03-13 @ 07:29)    03-15    142  |  106  |  5<L>  ----------------------------<  125<H>  3.2<L>   |  26  |  0.59    Ca    9.1      15 Mar 2018 06:26    < from: CT Abdomen and Pelvis w/ Oral Cont and w/ IV Cont (03.14.18 @ 17:42) >    EXAM:  CT CHEST IC                          EXAM:  CT ABDOMEN AND PELVIS OC IC                            PROCEDURE DATE:  03/14/2018            INTERPRETATION:  CLINICAL INFORMATION: Dysphasia, found to have severe   distal esophageal stricture. Rule out tumor.    COMPARISON: None.    PROCEDURE:       FINDINGS:    CHEST:     LUNGS AND LARGE AIRWAYS: Patent central airways. There is a 1.9 cm   partially groundglass spiculated lesion in the right upper lobe which is   nonspecific but suspicious for malignancy. There is a 6 mm nodule in the   left lower lobe on series 3 image 54. There is bilateral lower lobe   linear atelectasis.  PLEURA: No pleural effusion.  VESSELS: Aortic and coronary artery calcifications.  HEART: Heart size is normal. No pericardial effusion.  MEDIASTINUM AND KASSI: No lymphadenopathy.  CHEST WALL AND LOWER NECK: Enlarged and heterogeneously enhancing left   lobe of the thyroid gland suggestive of goiter.    ABDOMEN AND PELVIS:    LIVER: Within normal limits.  BILE DUCTS: Normal caliber.  GALLBLADDER: Within normal limits.  SPLEEN: Within normal limits.  PANCREAS: 1.4 cm peripherally enhancing lesion in the tail the pancreas.  ADRENALS: Left adrenal mass inseparable from infiltrating soft tissue   density contacting the anterior and left aspect of the aorta and the   posterior aspect of the gastroesophageal junction.  KIDNEYS/URETERS: Bilateral renal cysts and subcentimeter hypodense   lesions which are too small to characterize.     BLADDER: Within normal limits.  REPRODUCTIVE ORGANS: Status post hysterectomy.    BOWEL: There is diffuse thickening of the distal esophagus spanning   approximately 7 cm with soft tissue mass extending posteriorly from the   thickened gastroesophageal junction as described above. These findings   are suggestive of malignancy.  PERITONEUM: No ascites.  VESSELS:  Within normal limits.  RETROPERITONEUM: Soft tissue infiltration as above.  ABDOMINAL WALL: Within normal limits.  BONES: Bilateral infraspinatus calcific tendinosis. Marked scoliosis with   subluxations and degenerative changes.    IMPRESSION:   Thickened distal esophagus spanning approximately 7 mm with infiltrative   mass extending posteriorly from the gastroesophageal junction and   contacting the anterior and left lateral aspects of the aorta and   inseparable from a left adrenal mass. 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    1.9 cm spiculated right upper lobe nodule either representing a primary   malignancy or metastatic disease.    1.4 cm peripherally enhancing lesion within the tail the pancreas which   is also likely neoplastic.        < from: Upper Endoscopy (03.13.18 @ 16:28) >    [facility]  ____________________________________________________________________________________________________  Patient Name: Lesli Osman                     MRN: [id number]  Account Number: [id number]                     YOB: 1929  Room: Endoscopy Room 3                           Gender: Female  Attending MD: Mahin Phelan MD                    Procedure Date No Time: 3/13/2018  ____________________________________________________________________________________________________     Procedure:           Upper GI endoscopy  Indications:         Dysphagia x 2 week  Providers:           Mahin Phelan MD, Surinder Smith (Fellow)  Requesting Provider: SHAY BASHIR  Medicines:           Monitored Anesthesia Care  Complications:       No immediate complications.  ____________________________________________________________________________________________________  Procedure:           Pre-Anesthesia Assessment:                       - Monitored anesthesia care under the supervision of an anesthesiologist was                        determined to be medically necessary for this procedure based on review of                        the patient's medical history, medications, and prior anesthesia history.                       After obtaining informed consent, the endoscope was passed under direct                        vision. Throughout the procedure, the patient's blood pressure, pulse, and                        oxygen saturations were monitoredcontinuously. The Endoscope was introduced                        through the mouth, and advanced to the second part of duodenum. The Endoscope                        was introduced through the mouth, and advanced to the lower third of           esophagus. The upper GI endoscopy was accomplished without difficulty. The                        patient tolerated the procedure well.                                                                                                        Findings:       An area of stenosis was found 35 cm from the incisors at the GE junction. This could not be        traversed with a standard upper endoscope. A transnasal scope was used to traverse the        stenosis.       The exam of the esophagus wasotherwise normal.       Mucosal changes characterized by congestion, erythema and nodularity were found in the        cardia, in the gastric fundus and in the gastric body. The abnormal area spanned 35cm to        42cm. Biopsies were taken with a cold forceps for histology. Estimated blood loss: none.       The exam of the stomach was otherwise normal.       A diverticulum was found in the second part of the duodenum.       The exam of the duodenum was otherwise normal.                                                                              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 Lumen is                        approximately 5-6 mm in diameter.                       - 6-7cm long segment of congested, erythematous and nodular mucosa in the                        cardia, gastric fundus and proximal gastric body. Appearanceis suspicious                        for linitus plastica or lymphoma. Biopsied.                       - Duodenal diverticulum.  Recommendation:      - [facility].                       - Await pathology results.                       - Obtain a CT of the chest abdomen and pelvis with IV contrast for staging                        purposes                       - The patient states she can tolerate liquids, careful trial of liquid diet                        with high calorie supplements. Nutrition evaluation.                       - Discussed with Dr. Bashir.   Surgical Pathology Report (03.13.18 @ 17:04)    Surgical Pathology Report:   ACCESSION No:  [id number]    LESLI OSMAN  Surgical Final Report    Final Diagnosis  Proximal stomach, endoscopic biopsy:  - Invasive adenocarcinoma, poorly differentiated, see  comment  - Negative for H. pylori (Warthin-Starry stain)    Comment:  Immunohistochemical stain, HER2/ÁNGEL, is being performed and the  result will be reported as an addendum.    Complete Blood Count in AM (03.13.18 @ 07:31)    Nucleated RBC: 0 /100 WBCs    WBC Count: 7.07 K/uL    RBC Count: 4.09 M/uL    Hemoglobin: 12.9 g/dL    Hematocrit: 38.1 %    Mean Cell Volume: 93.2 fl    Mean Cell Hemoglobin: 31.5 pg    Mean Cell Hemoglobin Conc: 33.9 gm/dL    Red Cell Distrib Width: 13.7 %    Platelet Count - Automated: 261 K/uL    Vitamin B12, Serum (03.13.18 @ 07:29)    Vitamin B12, Serum: 915: Note: Reference Range Change on 12/18/2017. pg/mL Patient is a 88y old  Female who presents with a chief complaint of unable to swallow (12 Mar 2018 20:56)      HPI:  87 yo F sent in by her gastroenterologist (Dr. Waldrop) for ongoing difficulty swallowing and failure to thrive for last 3-4 weeks. Full EGD was unable to be obtained secondary to severity of narrowing. Pt reports about 10 pounds of unintentional weight loss over the last 1 month. Able to swallow solids and liquids but often "choking" with copious amount of non bloody phlegm and does not believe she is able to adequately eat or hydrate. (12 Mar 2018 20:56). Patient reports retrosternal discomfort. she had endoscopy - narrowing at GE junction and linitis picture - biopsy - poorly differentiated adenocarcinoma. CT CAP done - 1.9 cm RUL lung nodule, Left adrenal mass inseparable from infiltrating soft tissue   density contacting the anterior and left aspect of the aorta and the posterior aspect of the gastroesophageal junction, pancreatic tail nodule.  Patient has passive smoking exposure -  was a heavy smoker - had larynx cancer.  No headaches, vison, gait c/o.      REVIEW OF SYSTEMS:    CONSTITUTIONAL: No weakness, fevers or chills  EYES/ENT: No visual changes;  No vertigo or throat pain   NECK: No pain or stiffness  RESPIRATORY: No wheezing, hemoptysis; No shortness of breath.  cough +  CARDIOVASCULAR: No chest discomfort.  palpitations +  GASTROINTESTINAL: No abdominal or epigastric pain. No nausea, vomiting, or hematemesis; No diarrhea or constipation. No melena or hematochezia.  GENITOURINARY: No dysuria, frequency or hematuria  NEUROLOGICAL: No numbness or weakness  SKIN: No itching, burning, rashes, or lesions     PHYSICAL EXAM  General: adult in NAD  HEENT: clear oropharynx, anicteric sclera, pink conjunctiva  Neck: supple  CV: normal S1/S2 with no murmur rubs or gallops  Lungs: positive air movement b/l ant lungs,clear to auscultation, no wheezes, no rales  Abdomen: soft non-tender non-distended, no hepatosplenomegaly  Ext: no clubbing cyanosis or edema  Skin: no rashes and no petechiae  Neuro: alert and oriented X 4, no focal deficits        MEDICATIONS  (STANDING):  amLODIPine   Tablet 10 milliGRAM(s) Oral at bedtime  heparin  Injectable 5000 Unit(s) SubCutaneous every 12 hours  hydrochlorothiazide 12.5 milliGRAM(s) Oral daily  latanoprost 0.005% Ophthalmic Solution 1 Drop(s) Both EYES at bedtime  levothyroxine 75 MICROGram(s) Oral daily  lisinopril 20 milliGRAM(s) Oral at bedtime  losartan 100 milliGRAM(s) Oral daily  pantoprazole  Injectable 40 milliGRAM(s) IV Push daily    MEDICATIONS  (PRN):      Allergies    No Known Allergies    Intolerances        Vital Signs Last 24 Hrs  T(C): 36.7 (16 Mar 2018 08:51), Max: 36.8 (15 Mar 2018 16:11)  T(F): 98.1 (16 Mar 2018 08:51), Max: 98.2 (15 Mar 2018 16:11)  HR: 90 (16 Mar 2018 08:51) (90 - 103)  BP: 132/78 (16 Mar 2018 08:51) (110/75 - 149/79)  BP(mean): --  RR: 18 (16 Mar 2018 08:51) (18 - 18)  SpO2: 96% (16 Mar 2018 08:51) (95% - 96%)      LABS:          Serial CBC's  03-13 @ 07:31  Hct-38.1 / Hgb-12.9 / Plat-261 / RBC-4.09 / WBC-7.07        03-16    142  |  104  |  8   ----------------------------<  108<H>  3.9   |  25  |  0.59    Ca    9.9      16 Mar 2018 06:48  Mg     1.5     03-16    Folate, Serum: >20.0 ng/mL (03-13 @ 07:29)  Vitamin B12, Serum: 915 pg/mL (03-13 @ 07:29)    03-15    142  |  106  |  5<L>  ----------------------------<  125<H>  3.2<L>   |  26  |  0.59    Ca    9.1      15 Mar 2018 06:26    < from: CT Abdomen and Pelvis w/ Oral Cont and w/ IV Cont (03.14.18 @ 17:42) >    EXAM:  CT CHEST IC                          EXAM:  CT ABDOMEN AND PELVIS OC IC                            PROCEDURE DATE:  03/14/2018            INTERPRETATION:  CLINICAL INFORMATION: Dysphasia, found to have severe   distal esophageal stricture. Rule out tumor.    COMPARISON: None.    PROCEDURE:       FINDINGS:    CHEST:     LUNGS AND LARGE AIRWAYS: Patent central airways. There is a 1.9 cm   partially groundglass spiculated lesion in the right upper lobe which is   nonspecific but suspicious for malignancy. There is a 6 mm nodule in the   left lower lobe on series 3 image 54. There is bilateral lower lobe   linear atelectasis.  PLEURA: No pleural effusion.  VESSELS: Aortic and coronary artery calcifications.  HEART: Heart size is normal. No pericardial effusion.  MEDIASTINUM AND KASSI: No lymphadenopathy.  CHEST WALL AND LOWER NECK: Enlarged and heterogeneously enhancing left   lobe of the thyroid gland suggestive of goiter.    ABDOMEN AND PELVIS:    LIVER: Within normal limits.  BILE DUCTS: Normal caliber.  GALLBLADDER: Within normal limits.  SPLEEN: Within normal limits.  PANCREAS: 1.4 cm peripherally enhancing lesion in the tail the pancreas.  ADRENALS: Left adrenal mass inseparable from infiltrating soft tissue   density contacting the anterior and left aspect of the aorta and the   posterior aspect of the gastroesophageal junction.  KIDNEYS/URETERS: Bilateral renal cysts and subcentimeter hypodense   lesions which are too small to characterize.     BLADDER: Within normal limits.  REPRODUCTIVE ORGANS: Status post hysterectomy.    BOWEL: There is diffuse thickening of the distal esophagus spanning   approximately 7 cm with soft tissue mass extending posteriorly from the   thickened gastroesophageal junction as described above. These findings   are suggestive of malignancy.  PERITONEUM: No ascites.  VESSELS:  Within normal limits.  RETROPERITONEUM: Soft tissue infiltration as above.  ABDOMINAL WALL: Within normal limits.  BONES: Bilateral infraspinatus calcific tendinosis. Marked scoliosis with   subluxations and degenerative changes.    IMPRESSION:   Thickened distal esophagus spanning approximately 7 mm with infiltrative   mass extending posteriorly from the gastroesophageal junction and   contacting the anterior and left lateral aspects of the aorta and   inseparable from a left adrenal mass. 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    1.9 cm spiculated right upper lobe nodule either representing a primary   malignancy or metastatic disease.    1.4 cm peripherally enhancing lesion within the tail the pancreas which   is also likely neoplastic.        < from: Upper Endoscopy (03.13.18 @ 16:28) >    [facility]  ____________________________________________________________________________________________________  Patient Name: Lesli Osman                     MRN: [id number]  Account Number: [id number]                     YOB: 1929  Room: Endoscopy Room 3                           Gender: Female  Attending MD: Mahin Phelan MD                    Procedure Date No Time: 3/13/2018  ____________________________________________________________________________________________________     Procedure:           Upper GI endoscopy  Indications:         Dysphagia x 2 week  Providers:           Mahin Phelan MD, Surinder Smith (Fellow)  Requesting Provider: SHAY BASHIR  Medicines:           Monitored Anesthesia Care  Complications:       No immediate complications.  ____________________________________________________________________________________________________  Procedure:           Pre-Anesthesia Assessment:                       - Monitored anesthesia care under the supervision of an anesthesiologist was                        determined to be medically necessary for this procedure based on review of                        the patient's medical history, medications, and prior anesthesia history.                       After obtaining informed consent, the endoscope was passed under direct                        vision. Throughout the procedure, the patient's blood pressure, pulse, and                        oxygen saturations were monitoredcontinuously. The Endoscope was introduced                        through the mouth, and advanced to the second part of duodenum. The Endoscope                        was introduced through the mouth, and advanced to the lower third of           esophagus. The upper GI endoscopy was accomplished without difficulty. The                        patient tolerated the procedure well.                                                                                                        Findings:       An area of stenosis was found 35 cm from the incisors at the GE junction. This could not be        traversed with a standard upper endoscope. A transnasal scope was used to traverse the        stenosis.       The exam of the esophagus wasotherwise normal.       Mucosal changes characterized by congestion, erythema and nodularity were found in the        cardia, in the gastric fundus and in the gastric body. The abnormal area spanned 35cm to        42cm. Biopsies were taken with a cold forceps for histology. Estimated blood loss: none.       The exam of the stomach was otherwise normal.       A diverticulum was found in the second part of the duodenum.       The exam of the duodenum was otherwise normal.                                                                              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 Lumen is                        approximately 5-6 mm in diameter.                       - 6-7cm long segment of congested, erythematous and nodular mucosa in the                        cardia, gastric fundus and proximal gastric body. Appearanceis suspicious                        for linitus plastica or lymphoma. Biopsied.                       - Duodenal diverticulum.  Recommendation:      - [facility].                       - Await pathology results.                       - Obtain a CT of the chest abdomen and pelvis with IV contrast for staging                        purposes                       - The patient states she can tolerate liquids, careful trial of liquid diet                        with high calorie supplements. Nutrition evaluation.                       - Discussed with Dr. Bashir.   Surgical Pathology Report (03.13.18 @ 17:04)    Surgical Pathology Report:   ACCESSION No:  [id number]    LESLI OSMAN                       1  Surgical Final Report    Final Diagnosis  Proximal stomach, endoscopic biopsy:  - Invasive adenocarcinoma, poorly differentiated, see  comment  - Negative for H. pylori (Warthin-Starry stain)    Comment:  Immunohistochemical stain, HER2/ÁNGEL, is being performed and the  result will be reported as an addendum.    Complete Blood Count in AM (03.13.18 @ 07:31)    Nucleated RBC: 0 /100 WBCs    WBC Count: 7.07 K/uL    RBC Count: 4.09 M/uL    Hemoglobin: 12.9 g/dL    Hematocrit: 38.1 %    Mean Cell Volume: 93.2 fl    Mean Cell Hemoglobin: 31.5 pg    Mean Cell Hemoglobin Conc: 33.9 gm/dL    Red Cell Distrib Width: 13.7 %    Platelet Count - Automated: 261 K/uL    Vitamin B12, Serum (03.13.18 @ 07:29)    Vitamin B12, Serum: 915: Note: Reference Range Change on 12/18/2017. pg/mL

## 2018-03-15 NOTE — PROGRESS NOTE ADULT - SUBJECTIVE AND OBJECTIVE BOX
Patient is a 88y old  Female who presents with a chief complaint of unable to swallow (12 Mar 2018 20:56)      SUBJECTIVE / OVERNIGHT EVENTS:  No chest pain. No shortness of breath. No complaints. No events overnight.  bx results noted.    MEDICATIONS  (STANDING):  amLODIPine   Tablet 10 milliGRAM(s) Oral at bedtime  heparin  Injectable 5000 Unit(s) SubCutaneous every 12 hours  hydrochlorothiazide 12.5 milliGRAM(s) Oral daily  latanoprost 0.005% Ophthalmic Solution 1 Drop(s) Both EYES at bedtime  levothyroxine 75 MICROGram(s) Oral daily  lisinopril 20 milliGRAM(s) Oral at bedtime  losartan 100 milliGRAM(s) Oral daily  pantoprazole  Injectable 40 milliGRAM(s) IV Push daily  potassium chloride   Solution 40 milliEquivalent(s) Oral every 4 hours  sodium chloride 0.9%. 1000 milliLiter(s) (75 mL/Hr) IV Continuous <Continuous>    MEDICATIONS  (PRN):      Vital Signs Last 24 Hrs  T(C): 37.1 (15 Mar 2018 07:39), Max: 37.1 (14 Mar 2018 13:05)  T(F): 98.7 (15 Mar 2018 07:39), Max: 98.8 (14 Mar 2018 13:05)  HR: 80 (15 Mar 2018 07:39) (80 - 98)  BP: 114/67 (15 Mar 2018 07:39) (114/67 - 157/76)  BP(mean): --  RR: 18 (15 Mar 2018 07:39) (18 - 18)  SpO2: 98% (15 Mar 2018 07:39) (96% - 99%)  CAPILLARY BLOOD GLUCOSE      POCT Blood Glucose.: 108 mg/dL (15 Mar 2018 08:11)  POCT Blood Glucose.: 114 mg/dL (14 Mar 2018 21:16)  POCT Blood Glucose.: 98 mg/dL (14 Mar 2018 12:30)    I&O's Summary    14 Mar 2018 07:01  -  15 Mar 2018 07:00  --------------------------------------------------------  IN: 540 mL / OUT: 0 mL / NET: 540 mL    15 Mar 2018 07:01  -  15 Mar 2018 11:08  --------------------------------------------------------  IN: 240 mL / OUT: 0 mL / NET: 240 mL        PHYSICAL EXAM:  GENERAL: NAD, well-developed  HEAD:  Atraumatic, Normocephalic  EYES: EOMI, PERRLA, conjunctiva and sclera clear  NECK: Supple, No JVD  CHEST/LUNG: Clear to auscultation bilaterally; No wheeze  HEART: Regular rate and rhythm; No murmurs, rubs, or gallops  ABDOMEN: Soft, Nontender, Nondistended; Bowel sounds present  EXTREMITIES:  2+ Peripheral Pulses, No clubbing, cyanosis, or edema  PSYCH: AAOx3  NEUROLOGY: non-focal  SKIN: No rashes or lesions    LABS:    03-15    142  |  106  |  5<L>  ----------------------------<  125<H>  3.2<L>   |  26  |  0.59    Ca    9.1      15 Mar 2018 06:26                RADIOLOGY & ADDITIONAL TESTS:    Imaging Personally Reviewed:  Surgical Pathology Report (03.13.18 @ 17:04)    Surgical Pathology Report:   ACCESSION No:  [id number]    MARTINA MOON                       1        Surgical Final Report          Final Diagnosis  Proximal stomach, endoscopic biopsy:  - Invasive adenocarcinoma, poorly differentiated, see  comment  - Negative for H. pylori (Warthin-Starry stain)    Comment:  Immunohistochemical stain, HER2/ÁNGEL, is being performed and the  result will be reported as an addendum.    This case was reviewed for  by Dr. Hilario, who  concurs with the diagnosis on March 14, 2018.    Case was discussed with Dr. Phelan by Dr. Gerardo Washington on March 14, 2018 at 4:45 pm.    Verified by: GERARDO WASHINGTON M.D.  (Electronic Signature)  Reported on: 03/14/18 16:49 EDT,[address] [place] [address]  [id number]  _________________________________________________________________    Clinical History  gastric mass rule out cancer    Specimen(s) Submitted  1     Proximal stomach biopsy    Gross Description  The specimen is received in formalin and the specimen container  is labeled: Proximal stomach.  It  consists of five fragments of  soft, tan-white tissue ranging from 0.1 to 0.3 cm in maximum  dimension. Special stains are requested. Entirely submitted.  One  cassette.    In addition to other data that may appear on the specimen  container,the label has been inspected to confirm the presence  of the patient's name and date of birth.    TEGAN 03/13/18 21:13  < from: CT Abdomen and Pelvis w/ Oral Cont and w/ IV Cont (03.14.18 @ 17:42) >  IMPRESSION:   Thickened distal esophagus spanning approximately 7 mm with infiltrative   mass extending posteriorly from the gastroesophageal junction and   contacting the anterior and left lateral aspects of the aorta and   inseparable from a left adrenal mass. 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    1.9 cm spiculated right upper lobe nodule either representing a primary   malignancy or metastatic disease.    1.4 cm peripherally enhancing lesion within the tail the pancreas which   is also likely neoplastic.        < end of copied text >        Consultant(s) Notes Reviewed:      Care Discussed with Consultants/Other Providers:

## 2018-03-15 NOTE — CONSULT NOTE ADULT - SUBJECTIVE AND OBJECTIVE BOX
Patient is a 88y old  Female who presents with a chief complaint of unable to swallow (12 Mar 2018 20:56)      HPI:  87 yo F sent in by her gastroenterologist (Dr. Waldrop) for ongoing difficulty swallowing and failure to thrive for last 3-4 weeks. Full EGD was unable to be obtained secondary to severity of narrowing. Pt reports about 10 pounds of unintentional weight loss over the last 1 month. Able to swallow solids and liquids but often "choking" with copious amount of non bloody phlegm and does not believe she is able to adequately eat or hydrate. (12 Mar 2018 20:56). Patient reports retrosternal discomfort. she had endoscopy - narrowing at GE junction and linitis picture - biopsy - poorly differentiated adenocarcinoma. CT CAP done - 1.9 cm RUL lung nodule, Left adrenal mass inseparable from infiltrating soft tissue   density contacting the anterior and left aspect of the aorta and the posterior aspect of the gastroesophageal junction, pancreatic tail nodule.  Patient has passive smoking exposure -  was a heavy smoker - had larynx cancer.  No headaches, vison, gait c/o.      PAST MEDICAL & SURGICAL HISTORY:  Hypertension  Hypothyroid  History of hysterectomy  History of tonsillectomy  History of appendectomy      SOCIAL HISTORY:  passive smoking exposure +  lives with niece and nephew    FAMILY HISTORY:  Mother - cancer  Brother - kidney cancer  sister - cancer    MEDICATIONS  (STANDING):  amLODIPine   Tablet 10 milliGRAM(s) Oral at bedtime  heparin  Injectable 5000 Unit(s) SubCutaneous every 12 hours  hydrochlorothiazide 12.5 milliGRAM(s) Oral daily  latanoprost 0.005% Ophthalmic Solution 1 Drop(s) Both EYES at bedtime  levothyroxine 75 MICROGram(s) Oral daily  lisinopril 20 milliGRAM(s) Oral at bedtime  losartan 100 milliGRAM(s) Oral daily  pantoprazole  Injectable 40 milliGRAM(s) IV Push daily  potassium chloride   Solution 40 milliEquivalent(s) Oral every 4 hours  sodium chloride 0.9%. 1000 milliLiter(s) (75 mL/Hr) IV Continuous <Continuous>    MEDICATIONS  (PRN):      Vital Signs Last 24 Hrs  T(C): 37.1 (15 Mar 2018 07:39), Max: 37.1 (14 Mar 2018 13:05)  T(F): 98.7 (15 Mar 2018 07:39), Max: 98.8 (14 Mar 2018 13:05)  HR: 80 (15 Mar 2018 07:39) (80 - 98)  BP: 114/67 (15 Mar 2018 07:39) (114/67 - 157/76)  BP(mean): --  RR: 18 (15 Mar 2018 07:39) (18 - 18)  SpO2: 98% (15 Mar 2018 07:39) (96% - 99%)  Allergies    No Known Allergies    Intolerances    REVIEW OF SYSTEMS:    CONSTITUTIONAL: No weakness, fevers or chills  EYES/ENT: No visual changes;  No vertigo or throat pain   NECK: No pain or stiffness  RESPIRATORY: No wheezing, hemoptysis; No shortness of breath.  cough +  CARDIOVASCULAR: No chest discomfort.  palpitations +  GASTROINTESTINAL: No abdominal or epigastric pain. No nausea, vomiting, or hematemesis; No diarrhea or constipation. No melena or hematochezia.  GENITOURINARY: No dysuria, frequency or hematuria  NEUROLOGICAL: No numbness or weakness  SKIN: No itching, burning, rashes, or lesions     PHYSICAL EXAM  General: adult in NAD  HEENT: clear oropharynx, anicteric sclera, pink conjunctiva  Neck: supple  CV: normal S1/S2 with no murmur rubs or gallops  Lungs: positive air movement b/l ant lungs,clear to auscultation, no wheezes, no rales  Abdomen: soft non-tender non-distended, no hepatosplenomegaly  Ext: no clubbing cyanosis or edema  Skin: no rashes and no petechiae  Neuro: alert and oriented X 4, no focal deficits    Labs:      03-15    142  |  106  |  5<L>  ----------------------------<  125<H>  3.2<L>   |  26  |  0.59    Ca    9.1      15 Mar 2018 06:26    < from: CT Abdomen and Pelvis w/ Oral Cont and w/ IV Cont (03.14.18 @ 17:42) >    EXAM:  CT CHEST IC                          EXAM:  CT ABDOMEN AND PELVIS OC IC                            PROCEDURE DATE:  03/14/2018            INTERPRETATION:  CLINICAL INFORMATION: Dysphasia, found to have severe   distal esophageal stricture. Rule out tumor.    COMPARISON: None.    PROCEDURE:       FINDINGS:    CHEST:     LUNGS AND LARGE AIRWAYS: Patent central airways. There is a 1.9 cm   partially groundglass spiculated lesion in the right upper lobe which is   nonspecific but suspicious for malignancy. There is a 6 mm nodule in the   left lower lobe on series 3 image 54. There is bilateral lower lobe   linear atelectasis.  PLEURA: No pleural effusion.  VESSELS: Aortic and coronary artery calcifications.  HEART: Heart size is normal. No pericardial effusion.  MEDIASTINUM AND KASSI: No lymphadenopathy.  CHEST WALL AND LOWER NECK: Enlarged and heterogeneously enhancing left   lobe of the thyroid gland suggestive of goiter.    ABDOMEN AND PELVIS:    LIVER: Within normal limits.  BILE DUCTS: Normal caliber.  GALLBLADDER: Within normal limits.  SPLEEN: Within normal limits.  PANCREAS: 1.4 cm peripherally enhancing lesion in the tail the pancreas.  ADRENALS: Left adrenal mass inseparable from infiltrating soft tissue   density contacting the anterior and left aspect of the aorta and the   posterior aspect of the gastroesophageal junction.  KIDNEYS/URETERS: Bilateral renal cysts and subcentimeter hypodense   lesions which are too small to characterize.     BLADDER: Within normal limits.  REPRODUCTIVE ORGANS: Status post hysterectomy.    BOWEL: There is diffuse thickening of the distal esophagus spanning   approximately 7 cm with soft tissue mass extending posteriorly from the   thickened gastroesophageal junction as described above. These findings   are suggestive of malignancy.  PERITONEUM: No ascites.  VESSELS:  Within normal limits.  RETROPERITONEUM: Soft tissue infiltration as above.  ABDOMINAL WALL: Within normal limits.  BONES: Bilateral infraspinatus calcific tendinosis. Marked scoliosis with   subluxations and degenerative changes.    IMPRESSION:   Thickened distal esophagus spanning approximately 7 mm with infiltrative   mass extending posteriorly from the gastroesophageal junction and   contacting the anterior and left lateral aspects of the aorta and   inseparable from a left adrenal mass. 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    1.9 cm spiculated right upper lobe nodule either representing a primary   malignancy or metastatic disease.    1.4 cm peripherally enhancing lesion within the tail the pancreas which   is also likely neoplastic.        < from: Upper Endoscopy (03.13.18 @ 16:28) >    [facility]  ____________________________________________________________________________________________________  Patient Name: Lesli Osman                     MRN: [id number]  Account Number: [id number]                     YOB: 1929  Room: Endoscopy Room 3                           Gender: Female  Attending MD: Mahin Phelan MD                    Procedure Date No Time: 3/13/2018  ____________________________________________________________________________________________________     Procedure:           Upper GI endoscopy  Indications:         Dysphagia x 2 week  Providers:           Mahin Phelan MD, Surinder Smith (Fellow)  Requesting Provider: SHAY BASHIR  Medicines:           Monitored Anesthesia Care  Complications:       No immediate complications.  ____________________________________________________________________________________________________  Procedure:           Pre-Anesthesia Assessment:                       - Monitored anesthesia care under the supervision of an anesthesiologist was                        determined to be medically necessary for this procedure based on review of                        the patient's medical history, medications, and prior anesthesia history.                       After obtaining informed consent, the endoscope was passed under direct                        vision. Throughout the procedure, the patient's blood pressure, pulse, and                        oxygen saturations were monitoredcontinuously. The Endoscope was introduced                        through the mouth, and advanced to the second part of duodenum. The Endoscope                        was introduced through the mouth, and advanced to the lower third of           esophagus. The upper GI endoscopy was accomplished without difficulty. The                        patient tolerated the procedure well.                                                                                                        Findings:       An area of stenosis was found 35 cm from the incisors at the GE junction. This could not be        traversed with a standard upper endoscope. A transnasal scope was used to traverse the        stenosis.       The exam of the esophagus wasotherwise normal.       Mucosal changes characterized by congestion, erythema and nodularity were found in the        cardia, in the gastric fundus and in the gastric body. The abnormal area spanned 35cm to        42cm. Biopsies were taken with a cold forceps for histology. Estimated blood loss: none.       The exam of the stomach was otherwise normal.       A diverticulum was found in the second part of the duodenum.       The exam of the duodenum was otherwise normal.                                                                              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 Lumen is                        approximately 5-6 mm in diameter.                       - 6-7cm long segment of congested, erythematous and nodular mucosa in the                        cardia, gastric fundus and proximal gastric body. Appearanceis suspicious                        for linitus plastica or lymphoma. Biopsied.                       - Duodenal diverticulum.  Recommendation:      - [facility].                       - Await pathology results.                       - Obtain a CT of the chest abdomen and pelvis with IV contrast for staging                        purposes                       - The patient states she can tolerate liquids, careful trial of liquid diet                        with high calorie supplements. Nutrition evaluation.                       - Discussed with Dr. Bashir.   Surgical Pathology Report (03.13.18 @ 17:04)    Surgical Pathology Report:   ACCESSION No:  [id number]    LESLI OSMAN                       1  Surgical Final Report    Final Diagnosis  Proximal stomach, endoscopic biopsy:  - Invasive adenocarcinoma, poorly differentiated, see  comment  - Negative for H. pylori (Warthin-Starry stain)    Comment:  Immunohistochemical stain, HER2/ÁNGEL, is being performed and the  result will be reported as an addendum.    Complete Blood Count in AM (03.13.18 @ 07:31)    Nucleated RBC: 0 /100 WBCs    WBC Count: 7.07 K/uL    RBC Count: 4.09 M/uL    Hemoglobin: 12.9 g/dL    Hematocrit: 38.1 %    Mean Cell Volume: 93.2 fl    Mean Cell Hemoglobin: 31.5 pg    Mean Cell Hemoglobin Conc: 33.9 gm/dL    Red Cell Distrib Width: 13.7 %    Platelet Count - Automated: 261 K/uL    Vitamin B12, Serum (03.13.18 @ 07:29)    Vitamin B12, Serum: 915: Note: Reference Range Change on 12/18/2017. pg/mL

## 2018-03-15 NOTE — PROGRESS NOTE ADULT - ASSESSMENT
87 yo F sent in by her gastroenterologist (Dr. Waldrop) for ongoing difficulty swallowing and failure to thrive secondary to "narrowing" of the esophagus. Full EGD was unable to be obtained secondary to severity of narrowing. Pt reports about 10 pounds of unintentional weight loss over the last 1 month. Able to swallow solids and liquids but often "choking" with copious amount of non bloody phlegm and does not believe she is able to adequately eat or hydrate.    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   Lumen is approximately 5-6 mm in diameter.  plan for EGD with stenting on Friday, pending medical oncology input  -consulted medical oncology      htn - c/w home meds    hypothyroid - c/w synthroid , normal  tsh

## 2018-03-15 NOTE — PROGRESS NOTE ADULT - ATTENDING COMMENTS
As above.    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  Also has pancreas and lung mass.    Recommendation:  Medical oncology assessment reviewed, plan EGD/stent tomorrow for dysphagia.    Risks/benefits/alternatives discussed, patient agrees to proceed.

## 2018-03-16 LAB
ANION GAP SERPL CALC-SCNC: 13 MMOL/L — SIGNIFICANT CHANGE UP (ref 5–17)
BUN SERPL-MCNC: 8 MG/DL — SIGNIFICANT CHANGE UP (ref 7–23)
CALCIUM SERPL-MCNC: 9.9 MG/DL — SIGNIFICANT CHANGE UP (ref 8.4–10.5)
CANCER AG125 SERPL-ACNC: 15 U/ML — SIGNIFICANT CHANGE UP
CEA SERPL-MCNC: 2.4 NG/ML — SIGNIFICANT CHANGE UP (ref 0–3.8)
CHLORIDE SERPL-SCNC: 104 MMOL/L — SIGNIFICANT CHANGE UP (ref 96–108)
CO2 SERPL-SCNC: 25 MMOL/L — SIGNIFICANT CHANGE UP (ref 22–31)
CREAT SERPL-MCNC: 0.59 MG/DL — SIGNIFICANT CHANGE UP (ref 0.5–1.3)
GLUCOSE SERPL-MCNC: 108 MG/DL — HIGH (ref 70–99)
MAGNESIUM SERPL-MCNC: 1.5 MG/DL — LOW (ref 1.6–2.6)
POTASSIUM SERPL-MCNC: 3.9 MMOL/L — SIGNIFICANT CHANGE UP (ref 3.5–5.3)
POTASSIUM SERPL-SCNC: 3.9 MMOL/L — SIGNIFICANT CHANGE UP (ref 3.5–5.3)
SODIUM SERPL-SCNC: 142 MMOL/L — SIGNIFICANT CHANGE UP (ref 135–145)

## 2018-03-16 RX ORDER — ONDANSETRON 8 MG/1
4 TABLET, FILM COATED ORAL ONCE
Qty: 0 | Refills: 0 | Status: COMPLETED | OUTPATIENT
Start: 2018-03-16 | End: 2018-03-16

## 2018-03-16 RX ORDER — MORPHINE SULFATE 50 MG/1
1 CAPSULE, EXTENDED RELEASE ORAL ONCE
Qty: 0 | Refills: 0 | Status: DISCONTINUED | OUTPATIENT
Start: 2018-03-16 | End: 2018-03-16

## 2018-03-16 RX ORDER — MORPHINE SULFATE 50 MG/1
2 CAPSULE, EXTENDED RELEASE ORAL EVERY 6 HOURS
Qty: 0 | Refills: 0 | Status: DISCONTINUED | OUTPATIENT
Start: 2018-03-16 | End: 2018-03-20

## 2018-03-16 RX ORDER — MAGNESIUM SULFATE 500 MG/ML
2 VIAL (ML) INJECTION ONCE
Qty: 0 | Refills: 0 | Status: COMPLETED | OUTPATIENT
Start: 2018-03-16 | End: 2018-03-16

## 2018-03-16 RX ORDER — ONDANSETRON 8 MG/1
4 TABLET, FILM COATED ORAL EVERY 6 HOURS
Qty: 0 | Refills: 0 | Status: DISCONTINUED | OUTPATIENT
Start: 2018-03-16 | End: 2018-03-18

## 2018-03-16 RX ADMIN — LATANOPROST 1 DROP(S): 0.05 SOLUTION/ DROPS OPHTHALMIC; TOPICAL at 21:20

## 2018-03-16 RX ADMIN — MORPHINE SULFATE 1 MILLIGRAM(S): 50 CAPSULE, EXTENDED RELEASE ORAL at 16:48

## 2018-03-16 RX ADMIN — MORPHINE SULFATE 2 MILLIGRAM(S): 50 CAPSULE, EXTENDED RELEASE ORAL at 22:40

## 2018-03-16 RX ADMIN — AMLODIPINE BESYLATE 10 MILLIGRAM(S): 2.5 TABLET ORAL at 21:20

## 2018-03-16 RX ADMIN — Medication 12.5 MILLIGRAM(S): at 05:48

## 2018-03-16 RX ADMIN — MORPHINE SULFATE 1 MILLIGRAM(S): 50 CAPSULE, EXTENDED RELEASE ORAL at 17:07

## 2018-03-16 RX ADMIN — ONDANSETRON 4 MILLIGRAM(S): 8 TABLET, FILM COATED ORAL at 19:46

## 2018-03-16 RX ADMIN — Medication 75 MICROGRAM(S): at 05:48

## 2018-03-16 RX ADMIN — LOSARTAN POTASSIUM 100 MILLIGRAM(S): 100 TABLET, FILM COATED ORAL at 05:48

## 2018-03-16 RX ADMIN — LISINOPRIL 20 MILLIGRAM(S): 2.5 TABLET ORAL at 21:20

## 2018-03-16 RX ADMIN — Medication 50 GRAM(S): at 16:49

## 2018-03-16 RX ADMIN — MORPHINE SULFATE 2 MILLIGRAM(S): 50 CAPSULE, EXTENDED RELEASE ORAL at 22:10

## 2018-03-16 RX ADMIN — PANTOPRAZOLE SODIUM 40 MILLIGRAM(S): 20 TABLET, DELAYED RELEASE ORAL at 11:21

## 2018-03-16 NOTE — PROGRESS NOTE ADULT - PROBLEM SELECTOR PLAN 1
Detailed d/w patient - today joined by niece and niece's   Reviewed pathology report and CT findings again  Await further path w/up  based on imaging, likely unresectable gastric cancer  lung nodule could be metastasis or 2nd primary  Agree with plan for stent placement  Likely will benefit form chemotherapy or immunotherapy (based on path findings) as good PS and no significant co morbidities  Patient anxious about avoiding side effects of chemo - explained better supportive care now than when her mother/ treated for cancer  Ct home meds  Nutrition support  Patient raised option of no treatment - I explained palliative / hospice care is always an option and that final decision should wait all reports, stent procedure in am  multiple questions from patient and family answered.

## 2018-03-16 NOTE — PROGRESS NOTE ADULT - SUBJECTIVE AND OBJECTIVE BOX
Pre-Endoscopy Evaluation      Referring Physician:  Dr. Bashir                                  Procedure: EGD w/ stent placement    Indication for Procedure:  Narrowing at GE junction, abnormal  CT / esophageal mass    Pertinent History: 88 year old female sent in by gastroenterologist for ongoing difficulty swallowing and failure to thrive for last 3-4 weeks. Full EGD was unable to be obtained secondary to severity of narrowing. Pt reports about 10 pounds of unintentional weight loss over the last 1 month. Endoscopy shows narrowing at GE junction and poorly differentiated adenocarcinoma. CT CAP with thickened distal esophagus spanning with infiltrative mass suggestive of a primary distal esophageal malignancy with associated metastatic disease. CT also shows RUL lung nodule and pancreatic tail nodule.        Sedation by Anesthesia [x ]    PAST MEDICAL & SURGICAL HISTORY:  Hypertension  Hypothyroid  History of hysterectomy  History of tonsillectomy  History of appendectomy      PMH of Gastroparesis [ ]  Gastric Surgery [ ]  Gastric Outlet Obstruction [ ]    Allergies    No Known Allergies    Intolerances    Latex allergy: [ ] yes [ x] no    Medications:MEDICATIONS  (STANDING):  amLODIPine   Tablet 10 milliGRAM(s) Oral at bedtime  heparin  Injectable 5000 Unit(s) SubCutaneous every 12 hours  hydrochlorothiazide 12.5 milliGRAM(s) Oral daily  latanoprost 0.005% Ophthalmic Solution 1 Drop(s) Both EYES at bedtime  levothyroxine 75 MICROGram(s) Oral daily  lisinopril 20 milliGRAM(s) Oral at bedtime  losartan 100 milliGRAM(s) Oral daily  magnesium sulfate  IVPB 2 Gram(s) IV Intermittent once  pantoprazole  Injectable 40 milliGRAM(s) IV Push daily    MEDICATIONS  (PRN):    Smoking: [ ] yes  [x ] no    AICD/PPM: [ ] yes   [x ] no    Pertinent lab data:    03-16    142  |  104  |  8   ----------------------------<  108<H>  3.9   |  25  |  0.59    Ca    9.9      16 Mar 2018 06:48  Mg     1.5     03-16    CAPILLARY BLOOD GLUCOSE    POCT Blood Glucose.: 115 mg/dL (15 Mar 2018 21:42)      < from: CT Abdomen and Pelvis w/ Oral Cont and w/ IV Cont (03.14.18 @ 17:42) >  Thickened distal esophagus spanning approximately 7 mm with infiltrative   mass extending posteriorly from the gastroesophageal junction and   contacting the anterior and left lateral aspects of the aorta and   inseparable from a left adrenal mass. 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    1.9 cm spiculated right upper lobe nodule either representing a primary   malignancy or metastatic disease.    1.4 cm peripherally enhancing lesion within the tail the pancreas which   is also likely neoplastic.    -----------------------------------------------------------------------------------------------      Physical Examination:  Daily   Vital Signs Last 24 Hrs  T(C): 36.7 (16 Mar 2018 08:51), Max: 36.8 (15 Mar 2018 16:11)  T(F): 98.1 (16 Mar 2018 08:51), Max: 98.2 (15 Mar 2018 16:11)  HR: 90 (16 Mar 2018 08:51) (90 - 103)  BP: 132/78 (16 Mar 2018 08:51) (110/75 - 149/79)  BP(mean): --  RR: 18 (16 Mar 2018 08:51) (18 - 18)  SpO2: 96% (16 Mar 2018 08:51) (95% - 96%)    Drug Dosing Weight  Height (cm): 139.7 (13 Mar 2018 19:30)  Weight (kg): 45 (13 Mar 2018 19:30)  BMI (kg/m2): 23.1 (13 Mar 2018 19:30)  BSA (m2): 1.3 (13 Mar 2018 19:30)    Constitutional: NAD    Neck:  No JVD    Respiratory: CTAB/L    Cardiovascular: S1 and S2    Gastrointestinal: BS+, soft, NT/ND    Extremities: No peripheral edema    Neurological: A/O x 3, no focal deficits    Psychiatric: Normal mood, normal affect    Skin: No rashes    Comments:    ASA Class: I [ ]  II [ ]  III [x ]  IV [ ]    The patient is a suitable candidate for the planned procedure unless box checked [ ]  No, explain: Pre-Endoscopy Evaluation      Referring Physician:  Dr. Bashir                                  Procedure: EGD w/ stent placement    Indication for Procedure:  Narrowing at GE junction, esophageal adenoca    Pertinent History: 88 year old female sent in by gastroenterologist for ongoing difficulty swallowing and failure to thrive for last 3-4 weeks. Full EGD was unable to be obtained secondary to severity of narrowing. Pt reports about 10 pounds of unintentional weight loss over the last 1 month. Endoscopy shows narrowing at GE junction and poorly differentiated adenocarcinoma. CT CAP with thickened distal esophagus spanning with infiltrative mass suggestive of a primary distal esophageal malignancy with associated metastatic disease. CT also shows RUL lung nodule and pancreatic tail nodule.        Sedation by Anesthesia [x ]    PAST MEDICAL & SURGICAL HISTORY:  Hypertension  Hypothyroid  History of hysterectomy  History of tonsillectomy  History of appendectomy      PMH of Gastroparesis [ ]  Gastric Surgery [ ]  Gastric Outlet Obstruction [ ]    Allergies    No Known Allergies    Intolerances    Latex allergy: [ ] yes [ x] no    Medications:MEDICATIONS  (STANDING):  amLODIPine   Tablet 10 milliGRAM(s) Oral at bedtime  heparin  Injectable 5000 Unit(s) SubCutaneous every 12 hours  hydrochlorothiazide 12.5 milliGRAM(s) Oral daily  latanoprost 0.005% Ophthalmic Solution 1 Drop(s) Both EYES at bedtime  levothyroxine 75 MICROGram(s) Oral daily  lisinopril 20 milliGRAM(s) Oral at bedtime  losartan 100 milliGRAM(s) Oral daily  magnesium sulfate  IVPB 2 Gram(s) IV Intermittent once  pantoprazole  Injectable 40 milliGRAM(s) IV Push daily    MEDICATIONS  (PRN):    Smoking: [ ] yes  [x ] no    AICD/PPM: [ ] yes   [x ] no    Pertinent lab data:    03-16    142  |  104  |  8   ----------------------------<  108<H>  3.9   |  25  |  0.59    Ca    9.9      16 Mar 2018 06:48  Mg     1.5     03-16    CAPILLARY BLOOD GLUCOSE    POCT Blood Glucose.: 115 mg/dL (15 Mar 2018 21:42)      < from: CT Abdomen and Pelvis w/ Oral Cont and w/ IV Cont (03.14.18 @ 17:42) >  Thickened distal esophagus spanning approximately 7 mm with infiltrative   mass extending posteriorly from the gastroesophageal junction and   contacting the anterior and left lateral aspects of the aorta and   inseparable from a left adrenal mass. 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    1.9 cm spiculated right upper lobe nodule either representing a primary   malignancy or metastatic disease.    1.4 cm peripherally enhancing lesion within the tail the pancreas which   is also likely neoplastic.    -----------------------------------------------------------------------------------------------      Physical Examination:  Daily   Vital Signs Last 24 Hrs  T(C): 36.7 (16 Mar 2018 08:51), Max: 36.8 (15 Mar 2018 16:11)  T(F): 98.1 (16 Mar 2018 08:51), Max: 98.2 (15 Mar 2018 16:11)  HR: 90 (16 Mar 2018 08:51) (90 - 103)  BP: 132/78 (16 Mar 2018 08:51) (110/75 - 149/79)  BP(mean): --  RR: 18 (16 Mar 2018 08:51) (18 - 18)  SpO2: 96% (16 Mar 2018 08:51) (95% - 96%)    Drug Dosing Weight  Height (cm): 139.7 (13 Mar 2018 19:30)  Weight (kg): 45 (13 Mar 2018 19:30)  BMI (kg/m2): 23.1 (13 Mar 2018 19:30)  BSA (m2): 1.3 (13 Mar 2018 19:30)    Constitutional: NAD    Neck:  No JVD    Respiratory: CTAB/L    Cardiovascular: S1 and S2    Gastrointestinal: BS+, soft, NT/ND    Extremities: No peripheral edema    Neurological: A/O x 3, no focal deficits    Psychiatric: Normal mood, normal affect    Skin: No rashes    Comments:    ASA Class: I [ ]  II [ ]  III [x ]  IV [ ]    The patient is a suitable candidate for the planned procedure unless box checked [ ]  No, explain:

## 2018-03-16 NOTE — PROGRESS NOTE ADULT - ASSESSMENT
89 yo F sent in by her gastroenterologist (Dr. Waldrop) for ongoing difficulty swallowing and failure to thrive secondary to "narrowing" of the esophagus. Full EGD was unable to be obtained secondary to severity of narrowing. Pt reports about 10 pounds of unintentional weight loss over the last 1 month. Able to swallow solids and liquids but often "choking" with copious amount of non bloody phlegm and does not believe she is able to adequately eat or hydrate.    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   Ct scan c/w metastatic disease  Lumen is approximately 5-6 mm in diameter.  plan for EGD with stenting on today,  -consulted medical oncology  - pt will follow up as out pt for pet scan and poss therapy.    htn - c/w home meds    hypothyroid - c/w synthroid , normal  tsh

## 2018-03-16 NOTE — PROGRESS NOTE ADULT - SUBJECTIVE AND OBJECTIVE BOX
Patient is a 88y old  Female who presents with a chief complaint of unable to swallow (12 Mar 2018 20:56)      HPI:  87 yo F sent in by her gastroenterologist (Dr. Waldrop) for ongoing difficulty swallowing and failure to thrive for last 3-4 weeks. Full EGD was unable to be obtained secondary to severity of narrowing. Pt reports about 10 pounds of unintentional weight loss over the last 1 month. Able to swallow solids and liquids but often "choking" with copious amount of non bloody phlegm and does not believe she is able to adequately eat or hydrate. (12 Mar 2018 20:56). Patient reports retrosternal discomfort. she had endoscopy - narrowing at GE junction and linitis picture - biopsy - poorly differentiated adenocarcinoma. CT CAP done - 1.9 cm RUL lung nodule, Left adrenal mass inseparable from infiltrating soft tissue   density contacting the anterior and left aspect of the aorta and the posterior aspect of the gastroesophageal junction, pancreatic tail nodule.  Patient has passive smoking exposure -  was a heavy smoker - had larynx cancer.  No headaches, vison, gait c/o.      REVIEW OF SYSTEMS:    CONSTITUTIONAL: No weakness, fevers or chills  EYES/ENT: No visual changes;  No vertigo or throat pain   NECK: No pain or stiffness  RESPIRATORY: No wheezing, hemoptysis; No shortness of breath.  cough +  CARDIOVASCULAR: No chest discomfort.  palpitations +  GASTROINTESTINAL: No abdominal or epigastric pain. No nausea, vomiting, or hematemesis; No diarrhea or constipation. No melena or hematochezia.  GENITOURINARY: No dysuria, frequency or hematuria  NEUROLOGICAL: No numbness or weakness  SKIN: No itching, burning, rashes, or lesions     PHYSICAL EXAM  General: adult in NAD  HEENT: clear oropharynx, anicteric sclera, pink conjunctiva  Neck: supple  CV: normal S1/S2 with no murmur rubs or gallops  Lungs: positive air movement b/l ant lungs,clear to auscultation, no wheezes, no rales  Abdomen: soft non-tender non-distended, no hepatosplenomegaly  Ext: no clubbing cyanosis or edema  Skin: no rashes and no petechiae  Neuro: alert and oriented X 4, no focal deficits        MEDICATIONS  (STANDING):  amLODIPine   Tablet 10 milliGRAM(s) Oral at bedtime  heparin  Injectable 5000 Unit(s) SubCutaneous every 12 hours  hydrochlorothiazide 12.5 milliGRAM(s) Oral daily  latanoprost 0.005% Ophthalmic Solution 1 Drop(s) Both EYES at bedtime  levothyroxine 75 MICROGram(s) Oral daily  lisinopril 20 milliGRAM(s) Oral at bedtime  losartan 100 milliGRAM(s) Oral daily  pantoprazole  Injectable 40 milliGRAM(s) IV Push daily    MEDICATIONS  (PRN):      Allergies    No Known Allergies    Intolerances        Vital Signs Last 24 Hrs  T(C): 36.7 (16 Mar 2018 08:51), Max: 36.8 (15 Mar 2018 16:11)  T(F): 98.1 (16 Mar 2018 08:51), Max: 98.2 (15 Mar 2018 16:11)  HR: 90 (16 Mar 2018 08:51) (90 - 103)  BP: 132/78 (16 Mar 2018 08:51) (110/75 - 149/79)  BP(mean): --  RR: 18 (16 Mar 2018 08:51) (18 - 18)  SpO2: 96% (16 Mar 2018 08:51) (95% - 96%)      LABS:          Serial CBC's  03-13 @ 07:31  Hct-38.1 / Hgb-12.9 / Plat-261 / RBC-4.09 / WBC-7.07        03-16    142  |  104  |  8   ----------------------------<  108<H>  3.9   |  25  |  0.59    Ca    9.9      16 Mar 2018 06:48  Mg     1.5     03-16    Folate, Serum: >20.0 ng/mL (03-13 @ 07:29)  Vitamin B12, Serum: 915 pg/mL (03-13 @ 07:29)    03-15    142  |  106  |  5<L>  ----------------------------<  125<H>  3.2<L>   |  26  |  0.59    Ca    9.1      15 Mar 2018 06:26    < from: CT Abdomen and Pelvis w/ Oral Cont and w/ IV Cont (03.14.18 @ 17:42) >    EXAM:  CT CHEST IC                          EXAM:  CT ABDOMEN AND PELVIS OC IC                            PROCEDURE DATE:  03/14/2018            INTERPRETATION:  CLINICAL INFORMATION: Dysphasia, found to have severe   distal esophageal stricture. Rule out tumor.    COMPARISON: None.    PROCEDURE:       FINDINGS:    CHEST:     LUNGS AND LARGE AIRWAYS: Patent central airways. There is a 1.9 cm   partially groundglass spiculated lesion in the right upper lobe which is   nonspecific but suspicious for malignancy. There is a 6 mm nodule in the   left lower lobe on series 3 image 54. There is bilateral lower lobe   linear atelectasis.  PLEURA: No pleural effusion.  VESSELS: Aortic and coronary artery calcifications.  HEART: Heart size is normal. No pericardial effusion.  MEDIASTINUM AND KASSI: No lymphadenopathy.  CHEST WALL AND LOWER NECK: Enlarged and heterogeneously enhancing left   lobe of the thyroid gland suggestive of goiter.    ABDOMEN AND PELVIS:    LIVER: Within normal limits.  BILE DUCTS: Normal caliber.  GALLBLADDER: Within normal limits.  SPLEEN: Within normal limits.  PANCREAS: 1.4 cm peripherally enhancing lesion in the tail the pancreas.  ADRENALS: Left adrenal mass inseparable from infiltrating soft tissue   density contacting the anterior and left aspect of the aorta and the   posterior aspect of the gastroesophageal junction.  KIDNEYS/URETERS: Bilateral renal cysts and subcentimeter hypodense   lesions which are too small to characterize.     BLADDER: Within normal limits.  REPRODUCTIVE ORGANS: Status post hysterectomy.    BOWEL: There is diffuse thickening of the distal esophagus spanning   approximately 7 cm with soft tissue mass extending posteriorly from the   thickened gastroesophageal junction as described above. These findings   are suggestive of malignancy.  PERITONEUM: No ascites.  VESSELS:  Within normal limits.  RETROPERITONEUM: Soft tissue infiltration as above.  ABDOMINAL WALL: Within normal limits.  BONES: Bilateral infraspinatus calcific tendinosis. Marked scoliosis with   subluxations and degenerative changes.    IMPRESSION:   Thickened distal esophagus spanning approximately 7 mm with infiltrative   mass extending posteriorly from the gastroesophageal junction and   contacting the anterior and left lateral aspects of the aorta and   inseparable from a left adrenal mass. 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    1.9 cm spiculated right upper lobe nodule either representing a primary   malignancy or metastatic disease.    1.4 cm peripherally enhancing lesion within the tail the pancreas which   is also likely neoplastic.        < from: Upper Endoscopy (03.13.18 @ 16:28) >    [facility]  ____________________________________________________________________________________________________  Patient Name: Lesli Osman                     MRN: [id number]  Account Number: [id number]                     YOB: 1929  Room: Endoscopy Room 3                           Gender: Female  Attending MD: Mahin Phelan MD                    Procedure Date No Time: 3/13/2018  ____________________________________________________________________________________________________     Procedure:           Upper GI endoscopy  Indications:         Dysphagia x 2 week  Providers:           Mahin Phelan MD, Surinder Smith (Fellow)  Requesting Provider: SHAY BASHIR  Medicines:           Monitored Anesthesia Care  Complications:       No immediate complications.  ____________________________________________________________________________________________________  Procedure:           Pre-Anesthesia Assessment:                       - Monitored anesthesia care under the supervision of an anesthesiologist was                        determined to be medically necessary for this procedure based on review of                        the patient's medical history, medications, and prior anesthesia history.                       After obtaining informed consent, the endoscope was passed under direct                        vision. Throughout the procedure, the patient's blood pressure, pulse, and                        oxygen saturations were monitoredcontinuously. The Endoscope was introduced                        through the mouth, and advanced to the second part of duodenum. The Endoscope                        was introduced through the mouth, and advanced to the lower third of           esophagus. The upper GI endoscopy was accomplished without difficulty. The                        patient tolerated the procedure well.                                                                                                        Findings:       An area of stenosis was found 35 cm from the incisors at the GE junction. This could not be        traversed with a standard upper endoscope. A transnasal scope was used to traverse the        stenosis.       The exam of the esophagus wasotherwise normal.       Mucosal changes characterized by congestion, erythema and nodularity were found in the        cardia, in the gastric fundus and in the gastric body. The abnormal area spanned 35cm to        42cm. Biopsies were taken with a cold forceps for histology. Estimated blood loss: none.       The exam of the stomach was otherwise normal.       A diverticulum was found in the second part of the duodenum.       The exam of the duodenum was otherwise normal.                                                                              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 Lumen is                        approximately 5-6 mm in diameter.                       - 6-7cm long segment of congested, erythematous and nodular mucosa in the                        cardia, gastric fundus and proximal gastric body. Appearanceis suspicious                        for linitus plastica or lymphoma. Biopsied.                       - Duodenal diverticulum.  Recommendation:      - [facility].                       - Await pathology results.                       - Obtain a CT of the chest abdomen and pelvis with IV contrast for staging                        purposes                       - The patient states she can tolerate liquids, careful trial of liquid diet                        with high calorie supplements. Nutrition evaluation.                       - Discussed with Dr. Bashir.   Surgical Pathology Report (03.13.18 @ 17:04)    Surgical Pathology Report:   ACCESSION No:  [id number]    LESLI OSMAN                       1  Surgical Final Report    Final Diagnosis  Proximal stomach, endoscopic biopsy:  - Invasive adenocarcinoma, poorly differentiated, see  comment  - Negative for H. pylori (Warthin-Starry stain)    Comment:  Immunohistochemical stain, HER2/ÁNGEL, is being performed and the  result will be reported as an addendum.    Complete Blood Count in AM (03.13.18 @ 07:31)    Nucleated RBC: 0 /100 WBCs    WBC Count: 7.07 K/uL    RBC Count: 4.09 M/uL    Hemoglobin: 12.9 g/dL    Hematocrit: 38.1 %    Mean Cell Volume: 93.2 fl    Mean Cell Hemoglobin: 31.5 pg    Mean Cell Hemoglobin Conc: 33.9 gm/dL    Red Cell Distrib Width: 13.7 %    Platelet Count - Automated: 261 K/uL    Vitamin B12, Serum (03.13.18 @ 07:29)    Vitamin B12, Serum: 915: Note: Reference Range Change on 12/18/2017. pg/mL

## 2018-03-16 NOTE — PROGRESS NOTE ADULT - SUBJECTIVE AND OBJECTIVE BOX
Patient is a 88y old  Female who presents with a chief complaint of unable to swallow (12 Mar 2018 20:56)      SUBJECTIVE / OVERNIGHT EVENTS:  No chest pain. No shortness of breath. No complaints. No events overnight.     MEDICATIONS  (STANDING):  amLODIPine   Tablet 10 milliGRAM(s) Oral at bedtime  heparin  Injectable 5000 Unit(s) SubCutaneous every 12 hours  hydrochlorothiazide 12.5 milliGRAM(s) Oral daily  latanoprost 0.005% Ophthalmic Solution 1 Drop(s) Both EYES at bedtime  levothyroxine 75 MICROGram(s) Oral daily  lisinopril 20 milliGRAM(s) Oral at bedtime  losartan 100 milliGRAM(s) Oral daily  magnesium sulfate  IVPB 2 Gram(s) IV Intermittent once  pantoprazole  Injectable 40 milliGRAM(s) IV Push daily    MEDICATIONS  (PRN):      Vital Signs Last 24 Hrs  T(C): 36.7 (16 Mar 2018 08:51), Max: 36.8 (15 Mar 2018 16:11)  T(F): 98.1 (16 Mar 2018 08:51), Max: 98.2 (15 Mar 2018 16:11)  HR: 90 (16 Mar 2018 08:51) (90 - 103)  BP: 132/78 (16 Mar 2018 08:51) (110/75 - 149/79)  BP(mean): --  RR: 18 (16 Mar 2018 08:51) (18 - 18)  SpO2: 96% (16 Mar 2018 08:51) (95% - 96%)  CAPILLARY BLOOD GLUCOSE      POCT Blood Glucose.: 115 mg/dL (15 Mar 2018 21:42)  POCT Blood Glucose.: 137 mg/dL (15 Mar 2018 17:02)  POCT Blood Glucose.: 121 mg/dL (15 Mar 2018 12:19)    I&O's Summary    15 Mar 2018 07:01  -  16 Mar 2018 07:00  --------------------------------------------------------  IN: 960 mL / OUT: 0 mL / NET: 960 mL        PHYSICAL EXAM:  GENERAL: NAD, well-developed  HEAD:  Atraumatic, Normocephalic  EYES: EOMI, PERRLA, conjunctiva and sclera clear  NECK: Supple, No JVD  CHEST/LUNG: Clear to auscultation bilaterally; No wheeze  HEART: Regular rate and rhythm; No murmurs, rubs, or gallops  ABDOMEN: Soft, Nontender, Nondistended; Bowel sounds present  EXTREMITIES:  2+ Peripheral Pulses, No clubbing, cyanosis, or edema  PSYCH: AAOx3  NEUROLOGY: non-focal  SKIN: No rashes or lesions    LABS:    03-16    142  |  104  |  8   ----------------------------<  108<H>  3.9   |  25  |  0.59    Ca    9.9      16 Mar 2018 06:48  Mg     1.5     03-16                RADIOLOGY & ADDITIONAL TESTS:    Imaging Personally Reviewed:    Consultant(s) Notes Reviewed:      Care Discussed with Consultants/Other Providers:

## 2018-03-17 DIAGNOSIS — K22.2 ESOPHAGEAL OBSTRUCTION: ICD-10-CM

## 2018-03-17 LAB
ANION GAP SERPL CALC-SCNC: 19 MMOL/L — HIGH (ref 5–17)
BUN SERPL-MCNC: 12 MG/DL — SIGNIFICANT CHANGE UP (ref 7–23)
CALCIUM SERPL-MCNC: 9.6 MG/DL — SIGNIFICANT CHANGE UP (ref 8.4–10.5)
CANCER AG19-9 SERPL-ACNC: 15.5 U/ML — SIGNIFICANT CHANGE UP
CHLORIDE SERPL-SCNC: 96 MMOL/L — SIGNIFICANT CHANGE UP (ref 96–108)
CO2 SERPL-SCNC: 22 MMOL/L — SIGNIFICANT CHANGE UP (ref 22–31)
CREAT SERPL-MCNC: 0.63 MG/DL — SIGNIFICANT CHANGE UP (ref 0.5–1.3)
GLUCOSE SERPL-MCNC: 153 MG/DL — HIGH (ref 70–99)
HCT VFR BLD CALC: 38.8 % — SIGNIFICANT CHANGE UP (ref 34.5–45)
HGB BLD-MCNC: 14 G/DL — SIGNIFICANT CHANGE UP (ref 11.5–15.5)
MCHC RBC-ENTMCNC: 31.7 PG — SIGNIFICANT CHANGE UP (ref 27–34)
MCHC RBC-ENTMCNC: 36.1 GM/DL — HIGH (ref 32–36)
MCV RBC AUTO: 88 FL — SIGNIFICANT CHANGE UP (ref 80–100)
NRBC # BLD: 0 /100 WBCS — SIGNIFICANT CHANGE UP (ref 0–0)
PLATELET # BLD AUTO: 294 K/UL — SIGNIFICANT CHANGE UP (ref 150–400)
POTASSIUM SERPL-MCNC: 3.4 MMOL/L — LOW (ref 3.5–5.3)
POTASSIUM SERPL-SCNC: 3.4 MMOL/L — LOW (ref 3.5–5.3)
RBC # BLD: 4.41 M/UL — SIGNIFICANT CHANGE UP (ref 3.8–5.2)
RBC # FLD: 13.7 % — SIGNIFICANT CHANGE UP (ref 10.3–14.5)
SODIUM SERPL-SCNC: 137 MMOL/L — SIGNIFICANT CHANGE UP (ref 135–145)
WBC # BLD: 11.92 K/UL — HIGH (ref 3.8–10.5)
WBC # FLD AUTO: 11.92 K/UL — HIGH (ref 3.8–10.5)

## 2018-03-17 PROCEDURE — 99232 SBSQ HOSP IP/OBS MODERATE 35: CPT | Mod: GC

## 2018-03-17 RX ORDER — POTASSIUM CHLORIDE 20 MEQ
40 PACKET (EA) ORAL ONCE
Qty: 0 | Refills: 0 | Status: COMPLETED | OUTPATIENT
Start: 2018-03-17 | End: 2018-03-17

## 2018-03-17 RX ORDER — PANTOPRAZOLE SODIUM 20 MG/1
40 TABLET, DELAYED RELEASE ORAL EVERY 12 HOURS
Qty: 0 | Refills: 0 | Status: DISCONTINUED | OUTPATIENT
Start: 2018-03-17 | End: 2018-03-20

## 2018-03-17 RX ORDER — SODIUM CHLORIDE 9 MG/ML
1000 INJECTION INTRAMUSCULAR; INTRAVENOUS; SUBCUTANEOUS
Qty: 0 | Refills: 0 | Status: DISCONTINUED | OUTPATIENT
Start: 2018-03-17 | End: 2018-03-20

## 2018-03-17 RX ADMIN — ONDANSETRON 4 MILLIGRAM(S): 8 TABLET, FILM COATED ORAL at 08:43

## 2018-03-17 RX ADMIN — MORPHINE SULFATE 2 MILLIGRAM(S): 50 CAPSULE, EXTENDED RELEASE ORAL at 05:41

## 2018-03-17 RX ADMIN — MORPHINE SULFATE 2 MILLIGRAM(S): 50 CAPSULE, EXTENDED RELEASE ORAL at 13:06

## 2018-03-17 RX ADMIN — PANTOPRAZOLE SODIUM 40 MILLIGRAM(S): 20 TABLET, DELAYED RELEASE ORAL at 17:53

## 2018-03-17 RX ADMIN — HEPARIN SODIUM 5000 UNIT(S): 5000 INJECTION INTRAVENOUS; SUBCUTANEOUS at 05:42

## 2018-03-17 RX ADMIN — ONDANSETRON 4 MILLIGRAM(S): 8 TABLET, FILM COATED ORAL at 17:53

## 2018-03-17 RX ADMIN — MORPHINE SULFATE 2 MILLIGRAM(S): 50 CAPSULE, EXTENDED RELEASE ORAL at 19:13

## 2018-03-17 RX ADMIN — LATANOPROST 1 DROP(S): 0.05 SOLUTION/ DROPS OPHTHALMIC; TOPICAL at 21:35

## 2018-03-17 RX ADMIN — HEPARIN SODIUM 5000 UNIT(S): 5000 INJECTION INTRAVENOUS; SUBCUTANEOUS at 17:53

## 2018-03-17 RX ADMIN — LOSARTAN POTASSIUM 100 MILLIGRAM(S): 100 TABLET, FILM COATED ORAL at 05:42

## 2018-03-17 RX ADMIN — LISINOPRIL 20 MILLIGRAM(S): 2.5 TABLET ORAL at 21:35

## 2018-03-17 RX ADMIN — ONDANSETRON 4 MILLIGRAM(S): 8 TABLET, FILM COATED ORAL at 02:18

## 2018-03-17 RX ADMIN — Medication 75 MICROGRAM(S): at 05:42

## 2018-03-17 RX ADMIN — AMLODIPINE BESYLATE 10 MILLIGRAM(S): 2.5 TABLET ORAL at 21:35

## 2018-03-17 RX ADMIN — MORPHINE SULFATE 2 MILLIGRAM(S): 50 CAPSULE, EXTENDED RELEASE ORAL at 06:11

## 2018-03-17 RX ADMIN — Medication 40 MILLIEQUIVALENT(S): at 18:36

## 2018-03-17 RX ADMIN — Medication 12.5 MILLIGRAM(S): at 05:42

## 2018-03-17 RX ADMIN — SODIUM CHLORIDE 50 MILLILITER(S): 9 INJECTION INTRAMUSCULAR; INTRAVENOUS; SUBCUTANEOUS at 18:36

## 2018-03-17 NOTE — PROGRESS NOTE ADULT - SUBJECTIVE AND OBJECTIVE BOX
Patient is a 88y old  Female who presents with a chief complaint of unable to swallow (12 Mar 2018 20:56)      HPI:  87 yo F sent in by her gastroenterologist (Dr. Waldrop) for ongoing difficulty swallowing and failure to thrive for last 3-4 weeks. Full EGD was unable to be obtained secondary to severity of narrowing. Pt reports about 10 pounds of unintentional weight loss over the last 1 month. Able to swallow solids and liquids but often "choking" with copious amount of non bloody phlegm and does not believe she is able to adequately eat or hydrate. (12 Mar 2018 20:56). Patient reports retrosternal discomfort. she had endoscopy - narrowing at GE junction and linitis picture - biopsy - poorly differentiated adenocarcinoma. CT CAP done - 1.9 cm RUL lung nodule, Left adrenal mass inseparable from infiltrating soft tissue   density contacting the anterior and left aspect of the aorta and the posterior aspect of the gastroesophageal junction, pancreatic tail nodule.  Patient has passive smoking exposure -  was a heavy smoker - had larynx cancer.  No headaches, vison, gait c/o.    c/o pain in chest - better with morphine  threw up ensure - tolerated apple juice  No fevers/chills      REVIEW OF SYSTEMS:    CONSTITUTIONAL: No weakness, fevers or chills  EYES/ENT: No visual changes;  No vertigo or throat pain   NECK: No pain or stiffness  RESPIRATORY: No wheezing, hemoptysis; No shortness of breath.  cough +  CARDIOVASCULAR: No chest discomfort.  palpitations +  GASTROINTESTINAL: No abdominal or epigastric pain. No nausea, vomiting, or hematemesis; No diarrhea or constipation. No melena or hematochezia.  GENITOURINARY: No dysuria, frequency or hematuria  NEUROLOGICAL: No numbness or weakness  SKIN: No itching, burning, rashes, or lesions     PHYSICAL EXAM  General: adult in NAD  HEENT: clear oropharynx, anicteric sclera, pink conjunctiva  Neck: supple  CV: normal S1/S2 with no murmur rubs or gallops  Lungs: positive air movement b/l ant lungs,clear to auscultation, no wheezes, no rales  Abdomen: soft non-tender non-distended, no hepatosplenomegaly  Ext: no clubbing cyanosis or edema  Skin: no rashes and no petechiae  Neuro: alert and oriented X 4, no focal deficits        MEDICATIONS  (STANDING):  amLODIPine   Tablet 10 milliGRAM(s) Oral at bedtime  heparin  Injectable 5000 Unit(s) SubCutaneous every 12 hours  hydrochlorothiazide 12.5 milliGRAM(s) Oral daily  latanoprost 0.005% Ophthalmic Solution 1 Drop(s) Both EYES at bedtime  levothyroxine 75 MICROGram(s) Oral daily  lisinopril 20 milliGRAM(s) Oral at bedtime  losartan 100 milliGRAM(s) Oral daily  pantoprazole  Injectable 40 milliGRAM(s) IV Push every 12 hours    MEDICATIONS  (PRN):  morphine  - Injectable 2 milliGRAM(s) IV Push every 6 hours PRN Severe Pain (7 - 10)  ondansetron Injectable 4 milliGRAM(s) IV Push every 6 hours PRN Nausea and/or Vomiting      Allergies    No Known Allergies    Intolerances        Vital Signs Last 24 Hrs  T(C): 37.1 (17 Mar 2018 09:49), Max: 37.1 (17 Mar 2018 09:49)  T(F): 98.8 (17 Mar 2018 09:49), Max: 98.8 (17 Mar 2018 09:49)  HR: 96 (17 Mar 2018 09:49) (83 - 105)  BP: 115/61 (17 Mar 2018 09:49) (111/68 - 145/80)  BP(mean): --  RR: 18 (17 Mar 2018 09:49) (18 - 18)  SpO2: 96% (17 Mar 2018 09:49) (95% - 98%)      LABS:                          14.0   11.92 )-----------( 294      ( 17 Mar 2018 08:29 )             38.8         Mean Cell Volume : 88.0 fl  Mean Cell Hemoglobin : 31.7 pg  Mean Cell Hemoglobin Concentration : 36.1 gm/dL    03-17    137  |  96  |  12  ----------------------------<  153<H>  3.4<L>   |  22  |  0.63    Ca    9.6      17 Mar 2018 05:54  Mg     1.5     03-16    Carcinoembryonic Antigen (03.16.18 @ 07:44)    Carcinoembryonic Antigen: 2.4:     Cancer Antigen, 125 (03.16.18 @ 07:44)    Cancer Antigen, 125: 15:    Cancer Antigen, GI Ca 19-9 (03.16.18 @ 07:44)    Cancer Antigen, GI Ca 19-9: 15.5        < from: CT Abdomen and Pelvis w/ Oral Cont and w/ IV Cont (03.14.18 @ 17:42) >    EXAM:  CT CHEST IC                          EXAM:  CT ABDOMEN AND PELVIS OC IC                            PROCEDURE DATE:  03/14/2018            INTERPRETATION:  CLINICAL INFORMATION: Dysphasia, found to have severe   distal esophageal stricture. Rule out tumor.    COMPARISON: None.    PROCEDURE:       FINDINGS:    CHEST:     LUNGS AND LARGE AIRWAYS: Patent central airways. There is a 1.9 cm   partially groundglass spiculated lesion in the right upper lobe which is   nonspecific but suspicious for malignancy. There is a 6 mm nodule in the   left lower lobe on series 3 image 54. There is bilateral lower lobe   linear atelectasis.  PLEURA: No pleural effusion.  VESSELS: Aortic and coronary artery calcifications.  HEART: Heart size is normal. No pericardial effusion.  MEDIASTINUM AND KASSI: No lymphadenopathy.  CHEST WALL AND LOWER NECK: Enlarged and heterogeneously enhancing left   lobe of the thyroid gland suggestive of goiter.    ABDOMEN AND PELVIS:    LIVER: Within normal limits.  BILE DUCTS: Normal caliber.  GALLBLADDER: Within normal limits.  SPLEEN: Within normal limits.  PANCREAS: 1.4 cm peripherally enhancing lesion in the tail the pancreas.  ADRENALS: Left adrenal mass inseparable from infiltrating soft tissue   density contacting the anterior and left aspect of the aorta and the   posterior aspect of the gastroesophageal junction.  KIDNEYS/URETERS: Bilateral renal cysts and subcentimeter hypodense   lesions which are too small to characterize.     BLADDER: Within normal limits.  REPRODUCTIVE ORGANS: Status post hysterectomy.    BOWEL: There is diffuse thickening of the distal esophagus spanning   approximately 7 cm with soft tissue mass extending posteriorly from the   thickened gastroesophageal junction as described above. These findings   are suggestive of malignancy.  PERITONEUM: No ascites.  VESSELS:  Within normal limits.  RETROPERITONEUM: Soft tissue infiltration as above.  ABDOMINAL WALL: Within normal limits.  BONES: Bilateral infraspinatus calcific tendinosis. Marked scoliosis with   subluxations and degenerative changes.    IMPRESSION:   Thickened distal esophagus spanning approximately 7 mm with infiltrative   mass extending posteriorly from the gastroesophageal junction and   contacting the anterior and left lateral aspects of the aorta and   inseparable from a left adrenal mass. 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    1.9 cm spiculated right upper lobe nodule either representing a primary   malignancy or metastatic disease.    1.4 cm peripherally enhancing lesion within the tail the pancreas which   is also likely neoplastic.        < from: Upper Endoscopy (03.13.18 @ 16:28) >    [facility]  ____________________________________________________________________________________________________  Patient Name: Lesli Osman                     MRN: [id number]  Account Number: [id number]                     YOB: 1929  Room: Endoscopy Room 3                           Gender: Female  Attending MD: Mahin Phelan MD                    Procedure Date No Time: 3/13/2018  ____________________________________________________________________________________________________     Procedure:           Upper GI endoscopy  Indications:         Dysphagia x 2 week  Providers:           Mahin Phelan MD, Surinder Smith (Fellow)  Requesting Provider: SHAY BASHIR  Medicines:           Monitored Anesthesia Care  Complications:       No immediate complications.  ____________________________________________________________________________________________________  Procedure:           Pre-Anesthesia Assessment:                       - Monitored anesthesia care under the supervision of an anesthesiologist was                        determined to be medically necessary for this procedure based on review of                        the patient's medical history, medications, and prior anesthesia history.                       After obtaining informed consent, the endoscope was passed under direct                        vision. Throughout the procedure, the patient's blood pressure, pulse, and                        oxygen saturations were monitoredcontinuously. The Endoscope was introduced                        through the mouth, and advanced to the second part of duodenum. The Endoscope                        was introduced through the mouth, and advanced to the lower third of           esophagus. The upper GI endoscopy was accomplished without difficulty. The                        patient tolerated the procedure well.                                                                                                        Findings:       An area of stenosis was found 35 cm from the incisors at the GE junction. This could not be        traversed with a standard upper endoscope. A transnasal scope was used to traverse the        stenosis.       The exam of the esophagus wasotherwise normal.       Mucosal changes characterized by congestion, erythema and nodularity were found in the        cardia, in the gastric fundus and in the gastric body. The abnormal area spanned 35cm to        42cm. Biopsies were taken with a cold forceps for histology. Estimated blood loss: none.       The exam of the stomach was otherwise normal.       A diverticulum was found in the second part of the duodenum.       The exam of the duodenum was otherwise normal.                                                                              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 Lumen is                        approximately 5-6 mm in diameter.                       - 6-7cm long segment of congested, erythematous and nodular mucosa in the                        cardia, gastric fundus and proximal gastric body. Appearanceis suspicious                        for linitus plastica or lymphoma. Biopsied.                       - Duodenal diverticulum.  Recommendation:      - [facility].                       - Await pathology results.                       - Obtain a CT of the chest abdomen and pelvis with IV contrast for staging                        purposes                       - The patient states she can tolerate liquids, careful trial of liquid diet                        with high calorie supplements. Nutrition evaluation.                       - Discussed with Dr. Bashir.   Surgical Pathology Report (03.13.18 @ 17:04)    Surgical Pathology Report:   ACCESSION No:  [id number]    LESLI OSMAN                       1  Surgical Final Report    Final Diagnosis  Proximal stomach, endoscopic biopsy:  - Invasive adenocarcinoma, poorly differentiated, see  comment  - Negative for H. pylori (Warthin-Starry stain)    Comment:  Immunohistochemical stain, HER2/ÁNGEL, is being performed and the  result will be reported as an addendum.      Vitamin B12, Serum (03.13.18 @ 07:29)    Vitamin B12, Serum: 915: Note: Reference Range Change on 12/18/2017. pg/mL

## 2018-03-17 NOTE — PROGRESS NOTE ADULT - PROBLEM SELECTOR PLAN 1
Await further path w/up  based on imaging, likely unresectable gastric cancer  lung nodule could be metastasis or 2nd primary  Agree with plan for stent placement  Likely will benefit form chemotherapy or immunotherapy (based on path findings) as good PS and no significant co morbidities  Patient anxious about avoiding side effects of chemo - explained better supportive care now than when her mother/ treated for cancer  Ct home meds  Nutrition support

## 2018-03-17 NOTE — PROGRESS NOTE ADULT - ASSESSMENT
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 Wire guided fully covered Endomaxx stent placed under endoscopic and fluoroscopic guidance    Recommendation:  - trend CBC, transfuse as needed  - stent will expand over the next 48 hours, and patient tolerance of stent should improve over the next 72 hours, may use low dose opiates IV, but avoid nausea/vomiting  - full liquid diet as tolerated  - if patient develops fever, tachycardia, dyspnea, or other worrisome signs, obtain CXR and page GI stat   - continue pantoprazole 40 mg IV q12 hours  - aspiration precautions  - supportive care as per primary team    GI team will continue to follow.  Thank you for the consult. 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 Wire guided fully covered Endomaxx stent placed under endoscopic and fluoroscopic guidance    Recommendation:  - trend CBC, transfuse as needed  - stent will expand over the next 48 hours, and patient tolerance of stent should improve over the next 72 hours, may use low dose opiates IV, but avoid nausea/vomiting  - clear liquid diet as tolerated  - if patient develops fever, tachycardia, dyspnea, or other worrisome signs, obtain CXR and page GI stat   - continue pantoprazole 40 mg IV q12 hours  - aspiration precautions  - supportive care as per primary team    GI team will continue to follow.  Thank you for the consult.

## 2018-03-17 NOTE — PROGRESS NOTE ADULT - ASSESSMENT
89 yo F sent in by her gastroenterologist (Dr. Waldrop) for ongoing difficulty swallowing and failure to thrive secondary to "narrowing" of the esophagus. Full EGD was unable to be obtained secondary to severity of narrowing. Pt reports about 10 pounds of unintentional weight loss over the last 1 month. Able to swallow solids and liquids but often "choking" with copious amount of non bloody phlegm and does not believe she is able to adequately eat or hydrate.    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   Ct scan c/w metastatic disease, lung mass and pancreatic tail leision  Pt is s/p esophageal stent placement.  GI recommendations  #1.  Patient has postprocedure chest discomfort from the pressure of self-expandable metal stent.  No fever, tachycardia, dyspnea.  Pt given Zofran 4 mg IV, Tylenol 1 gram IV, and Morphine 2 mg IV  PRN.  Stent will expand over the next 48 hours, and patient tolerance of stent should improve over the next 72 hours.  May use low dose opiates IV, but avoid nausea/vomiting.    #2.  Full liquid diet as tolerated.    #3.  If patient develops fever, tachycardia, dyspnea, or other worrisome signs, obtain CXR and page GI.    #4.  Proton pump inhibitor (protonix 40 mg IV q12 hours)    #5.  Aspiration precautions.    -consulted medical oncology  - pt will follow up as out pt for pet scan and poss therapy.    htn - c/w home meds    hypothyroid - c/w synthroid , normal  tsh

## 2018-03-17 NOTE — PROGRESS NOTE ADULT - SUBJECTIVE AND OBJECTIVE BOX
Chief Complaint:  Patient is a 88y old  Female who presents with a chief complaint of unable to swallow (12 Mar 2018 20:56)      Interval Events:   Patient has postprocedure chest discomfort from the pressure of self-expandable metal stent.  No fever, tachycardia, dyspnea.  Pt given Zofran 4 mg IV, Tylenol 1 gram IV, and Morphine 1 mg IV postprocedure.     Allergies:  No Known Allergies      Hospital Medications:  amLODIPine   Tablet 10 milliGRAM(s) Oral at bedtime  heparin  Injectable 5000 Unit(s) SubCutaneous every 12 hours  hydrochlorothiazide 12.5 milliGRAM(s) Oral daily  latanoprost 0.005% Ophthalmic Solution 1 Drop(s) Both EYES at bedtime  levothyroxine 75 MICROGram(s) Oral daily  lisinopril 20 milliGRAM(s) Oral at bedtime  losartan 100 milliGRAM(s) Oral daily  morphine  - Injectable 2 milliGRAM(s) IV Push every 6 hours PRN  ondansetron Injectable 4 milliGRAM(s) IV Push every 6 hours PRN  pantoprazole  Injectable 40 milliGRAM(s) IV Push every 12 hours      PMHX/PSHX:  Hypertension  Hypothyroid  History of hysterectomy  History of tonsillectomy  History of appendectomy      Family history:  No pertinent family history in first degree relatives      ROS:     General:  No wt loss, fevers, chills, night sweats, fatigue,   Eyes:  Good vision, no reported pain  ENT:  No sore throat, pain, runny nose, dysphagia  CV:  No pain, palpitations, hypo/hypertension  Resp:  No dyspnea, cough, tachypnea, wheezing  GI:  See HPI  :  No pain, bleeding, incontinence, nocturia  Muscle:  No pain, weakness  Neuro:  No weakness, tingling, memory problems  Psych:  No fatigue, insomnia, mood problems, depression  Endocrine:  No polyuria, polydipsia, cold/heat intolerance  Heme:  No petechiae, ecchymosis, easy bruisability  Skin:  No rash, edema      PHYSICAL EXAM:     GENERAL:  Appears stated age, well-groomed, well-nourished, no distress  HEENT:  NC/AT,  conjunctivae clear, sclera -anicteric  CHEST:  Full & symmetric excursion, no increased effort, breath sounds clear  HEART:  Regular rhythm, S1, S2, no murmur/rub/S3/S4,  no edema  ABDOMEN:  Soft, non-tender, non-distended, normoactive bowel sounds,  no masses ,no hepato-splenomegaly,   EXTREMITIES:  no cyanosis,clubbing or edema  SKIN:  No rash/erythema/ecchymoses/petechiae/wounds/abscess/warm/dry  NEURO:  Alert, oriented    Vital Signs:  Vital Signs Last 24 Hrs  T(C): 36.7 (16 Mar 2018 17:30), Max: 37.3 (16 Mar 2018 12:11)  T(F): 98 (16 Mar 2018 17:30), Max: 99.2 (16 Mar 2018 12:11)  HR: 94 (17 Mar 2018 06:58) (83 - 105)  BP: 135/74 (17 Mar 2018 05:39) (104/63 - 145/80)  BP(mean): --  RR: 18 (17 Mar 2018 05:39) (18 - 18)  SpO2: 98% (17 Mar 2018 05:39) (95% - 98%)  Daily     Daily     LABS:                        14.0   11.92 )-----------( 294      ( 17 Mar 2018 08:29 )             38.8     03-17    137  |  96  |  12  ----------------------------<  153<H>  3.4<L>   |  22  |  0.63    Ca    9.6      17 Mar 2018 05:54  Mg     1.5     03-16      Imaging:    Brief Procedure Note:    Esophagoscopy with placement of fully covered metal stent    Indication:  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    Medications:  General anesthesia    Attendings:  Dr. Easton Diane, Dr. Mahin Phelan    Fellow:  Dr. Johan Raygoza    Findings:  Long stricture involving the distal esophagus and proximal stomach.  Wire guided fully covered Endomaxx stent placed under endoscopic and fluoroscopic guidance.    Complications/EBL:  None.    Recommendations:    #1.  Patient has postprocedure chest discomfort from the pressure of self-expandable metal stent.  No fever, tachycardia, dyspnea.  Pt given Zofran 4 mg IV, Tylenol 1 gram IV, and Morphine 1 mg IV postprocedure.  Stent will expand over the next 48 hours, and patient tolerance of stent should improve over the next 72 hours.  May use low dose opiates IV, but avoid nausea/vomiting.    #2.  Full liquid diet as tolerated.    #3.  If patient develops fever, tachycardia, dyspnea, or other worrisome signs, obtain CXR and page GI.    #4.  Proton pump inhibitor (protonix 40 mg IV q12 hours)    #5.  Aspiration precautions.        < from: Upper Endoscopy (03.13.18 @ 16:28) >                                                                              Impression:          - Moderate-severe esophageal stenosis, 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 Lumen is                        approximately 5-6 mm in diameter.                       - 6-7cm long segment of congested, erythematous and nodular mucosa in the                        cardia, gastric fundus and proximal gastric body. Appearanceis suspicious                        for linitus plastica or lymphoma. Biopsied.                       - Duodenal diverticulum.  Recommendation:      - Return patient to [facility] for [facility] care.                       - Await pathology results.                       - Obtain a CT of the chest abdomen and pelvis with IV contrast for staging                        purposes                       - The patient states she can tolerate liquids, careful trial of liquid diet                        with high calorie supplements. Nutrition evaluation.                       - Discussed with Dr. Bashir.    < end of copied text > Chief Complaint:  Patient is a 88y old  Female who presents with a chief complaint of unable to swallow (12 Mar 2018 20:56)      Interval Events:   Patient had postprocedure chest discomfort from the pressure of self-expandable metal stent.  No fever, tachycardia, dyspnea.  Pt given Zofran 4 mg IV, Tylenol 1 gram IV, and Morphine 1 mg IV postprocedure.   She states that she tried to drink this am and has chest discomfort, relieved by morphine.  She denies vomiting.     Allergies:  No Known Allergies      Hospital Medications:  amLODIPine   Tablet 10 milliGRAM(s) Oral at bedtime  heparin  Injectable 5000 Unit(s) SubCutaneous every 12 hours  hydrochlorothiazide 12.5 milliGRAM(s) Oral daily  latanoprost 0.005% Ophthalmic Solution 1 Drop(s) Both EYES at bedtime  levothyroxine 75 MICROGram(s) Oral daily  lisinopril 20 milliGRAM(s) Oral at bedtime  losartan 100 milliGRAM(s) Oral daily  morphine  - Injectable 2 milliGRAM(s) IV Push every 6 hours PRN  ondansetron Injectable 4 milliGRAM(s) IV Push every 6 hours PRN  pantoprazole  Injectable 40 milliGRAM(s) IV Push every 12 hours      PMHX/PSHX:  Hypertension  Hypothyroid  History of hysterectomy  History of tonsillectomy  History of appendectomy      Family history:  No pertinent family history in first degree relatives      ROS:     General:  No wt loss, fevers, chills, night sweats, fatigue,   Eyes:  Good vision, no reported pain  ENT:  No sore throat, pain, runny nose, dysphagia  CV:  No pain, palpitations, hypo/hypertension  Resp:  No dyspnea, cough, tachypnea, wheezing  GI:  See HPI  :  No pain, bleeding, incontinence, nocturia  Muscle:  No pain, weakness  Neuro:  No weakness, tingling, memory problems  Psych:  No fatigue, insomnia, mood problems, depression  Endocrine:  No polyuria, polydipsia, cold/heat intolerance  Heme:  No petechiae, ecchymosis, easy bruisability  Skin:  No rash, edema      PHYSICAL EXAM:     GENERAL:  Appears stated age, well-groomed, well-nourished, no distress  HEENT:  NC/AT,  conjunctivae clear, sclera -anicteric  CHEST:  Full & symmetric excursion, no increased effort, breath sounds clear  HEART:  Regular rhythm, S1, S2, no murmur/rub/S3/S4,  no edema  ABDOMEN:  Soft, non-tender, non-distended, normoactive bowel sounds,  no masses ,no hepato-splenomegaly,   EXTREMITIES:  no cyanosis,clubbing or edema  SKIN:  No rash/erythema/ecchymoses/petechiae/wounds/abscess/warm/dry  NEURO:  Alert, oriented    Vital Signs:  Vital Signs Last 24 Hrs  T(C): 36.7 (16 Mar 2018 17:30), Max: 37.3 (16 Mar 2018 12:11)  T(F): 98 (16 Mar 2018 17:30), Max: 99.2 (16 Mar 2018 12:11)  HR: 94 (17 Mar 2018 06:58) (83 - 105)  BP: 135/74 (17 Mar 2018 05:39) (104/63 - 145/80)  BP(mean): --  RR: 18 (17 Mar 2018 05:39) (18 - 18)  SpO2: 98% (17 Mar 2018 05:39) (95% - 98%)  Daily     Daily     LABS:                        14.0   11.92 )-----------( 294      ( 17 Mar 2018 08:29 )             38.8     03-17    137  |  96  |  12  ----------------------------<  153<H>  3.4<L>   |  22  |  0.63    Ca    9.6      17 Mar 2018 05:54  Mg     1.5     03-16      Imaging:    Brief Procedure Note:    Esophagoscopy with placement of fully covered metal stent    Indication:  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    Medications:  General anesthesia    Attendings:  Dr. Easton Diane, Dr. Mahin Phelan    Fellow:  Dr. Johan Raygoza    Findings:  Long stricture involving the distal esophagus and proximal stomach.  Wire guided fully covered Endomaxx stent placed under endoscopic and fluoroscopic guidance.    Complications/EBL:  None.    Recommendations:    #1.  Patient has postprocedure chest discomfort from the pressure of self-expandable metal stent.  No fever, tachycardia, dyspnea.  Pt given Zofran 4 mg IV, Tylenol 1 gram IV, and Morphine 1 mg IV postprocedure.  Stent will expand over the next 48 hours, and patient tolerance of stent should improve over the next 72 hours.  May use low dose opiates IV, but avoid nausea/vomiting.    #2.  Full liquid diet as tolerated.    #3.  If patient develops fever, tachycardia, dyspnea, or other worrisome signs, obtain CXR and page GI.    #4.  Proton pump inhibitor (protonix 40 mg IV q12 hours)    #5.  Aspiration precautions.        < from: Upper Endoscopy (03.13.18 @ 16:28) >                                                                              Impression:          - Moderate-severe esophageal stenosis, 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 Lumen is                        approximately 5-6 mm in diameter.                       - 6-7cm long segment of congested, erythematous and nodular mucosa in the                        cardia, gastric fundus and proximal gastric body. Appearanceis suspicious                        for linitus plastica or lymphoma. Biopsied.                       - Duodenal diverticulum.  Recommendation:      - Return patient to [facility] for ongoing care.                       - Await pathology results.                       - Obtain a CT of the chest abdomen and pelvis with IV contrast for staging                        purposes                       - The patient states she can tolerate liquids, careful trial of liquid diet                        with high calorie supplements. Nutrition evaluation.                       - Discussed with Dr. Bashir.    < end of copied text > Chief Complaint:  Patient is a 88y old  Female who presents with a chief complaint of unable to swallow (12 Mar 2018 20:56)      Interval Events:   Patient had postprocedure chest discomfort from the pressure of self-expandable metal stent.  No fever, tachycardia, dyspnea.  Pt given Zofran 4 mg IV, Tylenol 1 gram IV, and Morphine 1 mg IV postprocedure.   She states that she tried to drink this am and has chest discomfort, relieved by morphine.  She denies vomiting.     Allergies:  No Known Allergies      Hospital Medications:  amLODIPine   Tablet 10 milliGRAM(s) Oral at bedtime  heparin  Injectable 5000 Unit(s) SubCutaneous every 12 hours  hydrochlorothiazide 12.5 milliGRAM(s) Oral daily  latanoprost 0.005% Ophthalmic Solution 1 Drop(s) Both EYES at bedtime  levothyroxine 75 MICROGram(s) Oral daily  lisinopril 20 milliGRAM(s) Oral at bedtime  losartan 100 milliGRAM(s) Oral daily  morphine  - Injectable 2 milliGRAM(s) IV Push every 6 hours PRN  ondansetron Injectable 4 milliGRAM(s) IV Push every 6 hours PRN  pantoprazole  Injectable 40 milliGRAM(s) IV Push every 12 hours      PMHX/PSHX:  Hypertension  Hypothyroid  History of hysterectomy  History of tonsillectomy  History of appendectomy      Family history:  No pertinent family history in first degree relatives      ROS:     General:  No wt loss, fevers, chills, night sweats, fatigue,   Eyes:  Good vision, no reported pain  ENT:  No sore throat, pain, runny nose, dysphagia  CV:  No pain, palpitations, hypo/hypertension  Resp:  No dyspnea, cough, tachypnea, wheezing  GI:  See HPI  :  No pain, bleeding, incontinence, nocturia  Muscle:  No pain, weakness  Neuro:  No weakness, tingling, memory problems  Psych:  No fatigue, insomnia, mood problems, depression  Endocrine:  No polyuria, polydipsia, cold/heat intolerance  Heme:  No petechiae, ecchymosis, easy bruisability  Skin:  No rash, edema      PHYSICAL EXAM:     GENERAL:  Appears stated age, well-groomed, well-nourished, no distress  HEENT:  NC/AT,  conjunctivae clear, sclera -anicteric  CHEST:  Full & symmetric excursion, no increased effort, breath sounds clear  HEART:  Regular rhythm, S1, S2, no murmur/rub/S3/S4,  no edema  ABDOMEN:  Soft, non-tender, non-distended, normoactive bowel sounds,  no masses ,no hepato-splenomegaly,   EXTREMITIES:  no cyanosis,clubbing or edema  SKIN:  No rash/erythema/ecchymoses/petechiae/wounds/abscess/warm/dry  NEURO:  Alert, oriented    Vital Signs:  Vital Signs Last 24 Hrs  T(C): 36.7 (16 Mar 2018 17:30), Max: 37.3 (16 Mar 2018 12:11)  T(F): 98 (16 Mar 2018 17:30), Max: 99.2 (16 Mar 2018 12:11)  HR: 94 (17 Mar 2018 06:58) (83 - 105)  BP: 135/74 (17 Mar 2018 05:39) (104/63 - 145/80)  BP(mean): --  RR: 18 (17 Mar 2018 05:39) (18 - 18)  SpO2: 98% (17 Mar 2018 05:39) (95% - 98%)  Daily     Daily     LABS:                        14.0   11.92 )-----------( 294      ( 17 Mar 2018 08:29 )             38.8     03-17    137  |  96  |  12  ----------------------------<  153<H>  3.4<L>   |  22  |  0.63    Ca    9.6      17 Mar 2018 05:54  Mg     1.5     03-16      Imaging:    Surgical Pathology Report (03.13.18 @ 17:04)    Surgical Pathology Report:   ACCESSION No:  [id number]    MARTINA MOON                       1        Surgical Final Report          Final Diagnosis  Proximal stomach, endoscopic biopsy:  - Invasive adenocarcinoma, poorly differentiated, see  comment  - Negative for H. pylori (Warthin-Starry stain)    Comment:  Immunohistochemical stain, HER2/ÁNGEL, is being performed and the  result will be reported as an addendum.    This case was reviewed for  by Dr. Hilario, who  concurs with the diagnosis on March 14, 2018.    Case was discussed with Dr. Phelan by Dr. Gerardo Washington on March 14, 2018 at 4:45 pm.    Verified by: GERARDO WASHINGTON M.D.  (Electronic Signature)  Reported on: 03/14/18 16:49 EDT,[address] [place] [address]  [id number]  _________________________________________________________________    Clinical History  gastric mass rule out cancer    Specimen(s) Submitted  1     Proximal stomach biopsy    Gross Description  The specimen is received in formalin and the specimen container  is labeled: Proximal stomach.  It  consists of five fragments of  soft, tan-white tissue ranging from 0.1 to 0.3 cm in maximum  dimension. Special stains are requested. Entirely submitted.  One  cassette.    In addition to other data that may appear on the specimen  container,the label has been inspected to confirm the presence  of the patient's name and date of birth.     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    Medications:  General anesthesia    Attendings:  Dr. Easton Diane, Dr. Mahin Phelan    Fellow:  Dr. Johan Raygoza    Findings:  Long stricture involving the distal esophagus and proximal stomach.  Wire guided fully covered Endomaxx stent placed under endoscopic and fluoroscopic guidance.    Complications/EBL:  None.    Recommendations:    #1.  Patient has postprocedure chest discomfort from the pressure of self-expandable metal stent.  No fever, tachycardia, dyspnea.  Pt given Zofran 4 mg IV, Tylenol 1 gram IV, and Morphine 1 mg IV postprocedure.  Stent will expand over the next 48 hours, and patient tolerance of stent should improve over the next 72 hours.  May use low dose opiates IV, but avoid nausea/vomiting.    #2.  Full liquid diet as tolerated.    #3.  If patient develops fever, tachycardia, dyspnea, or other worrisome signs, obtain CXR and page GI.    #4.  Proton pump inhibitor (protonix 40 mg IV q12 hours)    #5.  Aspiration precautions.        < from: Upper Endoscopy (03.13.18 @ 16:28) >                                                                              Impression:          - Moderate-severe esophageal stenosis, 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 Lumen is                        approximately 5-6 mm in diameter.                       - 6-7cm long segment of congested, erythematous and nodular mucosa in the                        cardia, gastric fundus and proximal gastric body. Appearanceis suspicious                        for linitus plastica or lymphoma. Biopsied.                       - Duodenal diverticulum.  Recommendation:      - Return patient to [facility] for ongoing care.                       - Await pathology results.                       - Obtain a CT of the chest abdomen and pelvis with IV contrast for staging                        purposes                       - The patient states she can tolerate liquids, careful trial of liquid diet                        with high calorie supplements. Nutrition evaluation.                       - Discussed with Dr. Bashir.    < end of copied text >

## 2018-03-18 RX ORDER — ONDANSETRON 8 MG/1
8 TABLET, FILM COATED ORAL EVERY 6 HOURS
Qty: 0 | Refills: 0 | Status: DISCONTINUED | OUTPATIENT
Start: 2018-03-18 | End: 2018-03-20

## 2018-03-18 RX ADMIN — LISINOPRIL 20 MILLIGRAM(S): 2.5 TABLET ORAL at 22:37

## 2018-03-18 RX ADMIN — PANTOPRAZOLE SODIUM 40 MILLIGRAM(S): 20 TABLET, DELAYED RELEASE ORAL at 17:16

## 2018-03-18 RX ADMIN — Medication 12.5 MILLIGRAM(S): at 06:23

## 2018-03-18 RX ADMIN — HEPARIN SODIUM 5000 UNIT(S): 5000 INJECTION INTRAVENOUS; SUBCUTANEOUS at 06:23

## 2018-03-18 RX ADMIN — Medication 75 MICROGRAM(S): at 06:23

## 2018-03-18 RX ADMIN — ONDANSETRON 4 MILLIGRAM(S): 8 TABLET, FILM COATED ORAL at 09:30

## 2018-03-18 RX ADMIN — ONDANSETRON 4 MILLIGRAM(S): 8 TABLET, FILM COATED ORAL at 02:06

## 2018-03-18 RX ADMIN — ONDANSETRON 8 MILLIGRAM(S): 8 TABLET, FILM COATED ORAL at 22:59

## 2018-03-18 RX ADMIN — PANTOPRAZOLE SODIUM 40 MILLIGRAM(S): 20 TABLET, DELAYED RELEASE ORAL at 06:23

## 2018-03-18 RX ADMIN — SODIUM CHLORIDE 50 MILLILITER(S): 9 INJECTION INTRAMUSCULAR; INTRAVENOUS; SUBCUTANEOUS at 06:23

## 2018-03-18 RX ADMIN — MORPHINE SULFATE 2 MILLIGRAM(S): 50 CAPSULE, EXTENDED RELEASE ORAL at 23:14

## 2018-03-18 RX ADMIN — HEPARIN SODIUM 5000 UNIT(S): 5000 INJECTION INTRAVENOUS; SUBCUTANEOUS at 17:17

## 2018-03-18 RX ADMIN — LOSARTAN POTASSIUM 100 MILLIGRAM(S): 100 TABLET, FILM COATED ORAL at 06:23

## 2018-03-18 RX ADMIN — MORPHINE SULFATE 2 MILLIGRAM(S): 50 CAPSULE, EXTENDED RELEASE ORAL at 22:59

## 2018-03-18 RX ADMIN — ONDANSETRON 8 MILLIGRAM(S): 8 TABLET, FILM COATED ORAL at 16:45

## 2018-03-18 RX ADMIN — LATANOPROST 1 DROP(S): 0.05 SOLUTION/ DROPS OPHTHALMIC; TOPICAL at 22:37

## 2018-03-18 RX ADMIN — AMLODIPINE BESYLATE 10 MILLIGRAM(S): 2.5 TABLET ORAL at 22:38

## 2018-03-18 NOTE — PROGRESS NOTE ADULT - PROBLEM SELECTOR PLAN 1
Await further path w/up  based on imaging, likely unresectable gastric cancer  lung nodule could be metastasis or 2nd primary  Agree with plan for stent placement  Likely will benefit form chemotherapy or immunotherapy (based on path findings) as good PS and no significant co morbidities  Patient anxious about avoiding side effects of chemo - explained better supportive care now than when her mother/ treated for cancer  Ct home meds  Nutrition support Await further path w/up  based on imaging, likely unresectable gastric cancer  lung nodule could be metastasis or 2nd primary  Agree with plan for stent placement  Likely will benefit form chemotherapy or immunotherapy (based on path findings) as good PS and no significant co morbidities  Patient anxious about avoiding side effects of chemo - explained better supportive care now than when her mother/ treated for cancer  Ct home meds  Nutrition support - if adequate po intake and pain control - then discharge planning - outpatient PET scan and F/up for decision re. systemic treatment once final path obtained

## 2018-03-18 NOTE — CHART NOTE - NSCHARTNOTEFT_GEN_A_CORE
Nutrition Services  Diet Rxd- Soft with Ensure Enlive 8 ounces x2  Attending MD asked this RDN to see patient regarding food preferences. Patient eating poorly and dislikes Vanilla Ensure and not a fan of chocolate.  Visited with patient, who was spitting up into bucket.  Patient not taking Ensure but receptive to Strawberry Ensure  as well as strawberry ice cream and pound cake.  Overall appetite and intake poor.  Nutrition Services remains available to assisit.  Jennifer Collazo MA,RD,CHES,CDN,CSG  Beeper [phone]

## 2018-03-18 NOTE — PROGRESS NOTE ADULT - SUBJECTIVE AND OBJECTIVE BOX
Patient is a 88y old  Female who presents with a chief complaint of unable to swallow (12 Mar 2018 20:56)      HPI:  89 yo F sent in by her gastroenterologist (Dr. Waldrop) for ongoing difficulty swallowing and failure to thrive for last 3-4 weeks. Full EGD was unable to be obtained secondary to severity of narrowing. Pt reports about 10 pounds of unintentional weight loss over the last 1 month. Able to swallow solids and liquids but often "choking" with copious amount of non bloody phlegm and does not believe she is able to adequately eat or hydrate. (12 Mar 2018 20:56). Patient reports retrosternal discomfort. she had endoscopy - narrowing at GE junction and linitis picture - biopsy - poorly differentiated adenocarcinoma. CT CAP done - 1.9 cm RUL lung nodule, Left adrenal mass inseparable from infiltrating soft tissue   density contacting the anterior and left aspect of the aorta and the posterior aspect of the gastroesophageal junction, pancreatic tail nodule.  Patient has passive smoking exposure -  was a heavy smoker - had larynx cancer.  No headaches, vison, gait c/o.    c/o pain in chest - better with morphine  threw up ensure - tolerated apple juice  No fevers/chills      REVIEW OF SYSTEMS:    CONSTITUTIONAL: No weakness, fevers or chills  EYES/ENT: No visual changes;  No vertigo or throat pain   NECK: No pain or stiffness  RESPIRATORY: No wheezing, hemoptysis; No shortness of breath.  cough +  CARDIOVASCULAR: No chest discomfort.  palpitations +  GASTROINTESTINAL: No abdominal or epigastric pain. No nausea, vomiting, or hematemesis; No diarrhea or constipation. No melena or hematochezia.  GENITOURINARY: No dysuria, frequency or hematuria  NEUROLOGICAL: No numbness or weakness  SKIN: No itching, burning, rashes, or lesions     PHYSICAL EXAM  General: adult in NAD  HEENT: clear oropharynx, anicteric sclera, pink conjunctiva  Neck: supple  CV: normal S1/S2 with no murmur rubs or gallops  Lungs: positive air movement b/l ant lungs,clear to auscultation, no wheezes, no rales  Abdomen: soft non-tender non-distended, no hepatosplenomegaly  Ext: no clubbing cyanosis or edema  Skin: no rashes and no petechiae  Neuro: alert and oriented X 4, no focal deficits        MEDICATIONS  (STANDING):  amLODIPine   Tablet 10 milliGRAM(s) Oral at bedtime  heparin  Injectable 5000 Unit(s) SubCutaneous every 12 hours  hydrochlorothiazide 12.5 milliGRAM(s) Oral daily  latanoprost 0.005% Ophthalmic Solution 1 Drop(s) Both EYES at bedtime  levothyroxine 75 MICROGram(s) Oral daily  lisinopril 20 milliGRAM(s) Oral at bedtime  losartan 100 milliGRAM(s) Oral daily  pantoprazole  Injectable 40 milliGRAM(s) IV Push every 12 hours  sodium chloride 0.9%. 1000 milliLiter(s) (50 mL/Hr) IV Continuous <Continuous>    MEDICATIONS  (PRN):  morphine  - Injectable 2 milliGRAM(s) IV Push every 6 hours PRN Severe Pain (7 - 10)  ondansetron Injectable 4 milliGRAM(s) IV Push every 6 hours PRN Nausea and/or Vomiting      Allergies    No Known Allergies    Intolerances        Vital Signs Last 24 Hrs  T(C): 36.4 (18 Mar 2018 09:52), Max: 37.2 (17 Mar 2018 20:20)  T(F): 97.5 (18 Mar 2018 09:52), Max: 98.9 (17 Mar 2018 20:20)  HR: 90 (18 Mar 2018 09:52) (89 - 99)  BP: 115/72 (18 Mar 2018 09:52) (115/72 - 139/74)  BP(mean): --  RR: 18 (18 Mar 2018 09:52) (16 - 18)  SpO2: 96% (18 Mar 2018 09:52) (95% - 98%)      LABS:                          14.0   11.92 )-----------( 294      ( 17 Mar 2018 08:29 )             38.8         Mean Cell Volume : 88.0 fl  Mean Cell Hemoglobin : 31.7 pg  Mean Cell Hemoglobin Concentration : 36.1 gm/dL      03-17    137  |  96  |  12  ----------------------------<  153<H>  3.4<L>   |  22  |  0.63    Ca    9.6      17 Mar 2018 05:54    Carcinoembryonic Antigen (03.16.18 @ 07:44)    Carcinoembryonic Antigen: 2.4:     Cancer Antigen, 125 (03.16.18 @ 07:44)    Cancer Antigen, 125: 15:    Cancer Antigen, GI Ca 19-9 (03.16.18 @ 07:44)    Cancer Antigen, GI Ca 19-9: 15.5        < from: CT Abdomen and Pelvis w/ Oral Cont and w/ IV Cont (03.14.18 @ 17:42) >    EXAM:  CT CHEST IC                          EXAM:  CT ABDOMEN AND PELVIS OC IC                            PROCEDURE DATE:  03/14/2018            INTERPRETATION:  CLINICAL INFORMATION: Dysphasia, found to have severe   distal esophageal stricture. Rule out tumor.    COMPARISON: None.    PROCEDURE:       FINDINGS:    CHEST:     LUNGS AND LARGE AIRWAYS: Patent central airways. There is a 1.9 cm   partially groundglass spiculated lesion in the right upper lobe which is   nonspecific but suspicious for malignancy. There is a 6 mm nodule in the   left lower lobe on series 3 image 54. There is bilateral lower lobe   linear atelectasis.  PLEURA: No pleural effusion.  VESSELS: Aortic and coronary artery calcifications.  HEART: Heart size is normal. No pericardial effusion.  MEDIASTINUM AND KASSI: No lymphadenopathy.  CHEST WALL AND LOWER NECK: Enlarged and heterogeneously enhancing left   lobe of the thyroid gland suggestive of goiter.    ABDOMEN AND PELVIS:    LIVER: Within normal limits.  BILE DUCTS: Normal caliber.  GALLBLADDER: Within normal limits.  SPLEEN: Within normal limits.  PANCREAS: 1.4 cm peripherally enhancing lesion in the tail the pancreas.  ADRENALS: Left adrenal mass inseparable from infiltrating soft tissue   density contacting the anterior and left aspect of the aorta and the   posterior aspect of the gastroesophageal junction.  KIDNEYS/URETERS: Bilateral renal cysts and subcentimeter hypodense   lesions which are too small to characterize.     BLADDER: Within normal limits.  REPRODUCTIVE ORGANS: Status post hysterectomy.    BOWEL: There is diffuse thickening of the distal esophagus spanning   approximately 7 cm with soft tissue mass extending posteriorly from the   thickened gastroesophageal junction as described above. These findings   are suggestive of malignancy.  PERITONEUM: No ascites.  VESSELS:  Within normal limits.  RETROPERITONEUM: Soft tissue infiltration as above.  ABDOMINAL WALL: Within normal limits.  BONES: Bilateral infraspinatus calcific tendinosis. Marked scoliosis with   subluxations and degenerative changes.    IMPRESSION:   Thickened distal esophagus spanning approximately 7 mm with infiltrative   mass extending posteriorly from the gastroesophageal junction and   contacting the anterior and left lateral aspects of the aorta and   inseparable from a left adrenal mass. 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    1.9 cm spiculated right upper lobe nodule either representing a primary   malignancy or metastatic disease.    1.4 cm peripherally enhancing lesion within the tail the pancreas which   is also likely neoplastic.        < from: Upper Endoscopy (03.13.18 @ 16:28) >    [facility]  ____________________________________________________________________________________________________  Patient Name: Lesli Osman                     MRN: [id number]  Account Number: [id number]                     YOB: 1929  Room: Endoscopy Room 3                           Gender: Female  Attending MD: Mahin Phelan MD                    Procedure Date No Time: 3/13/2018  ____________________________________________________________________________________________________     Procedure:           Upper GI endoscopy  Indications:         Dysphagia x 2 week  Providers:           Mahin Phelan MD, Surinder Smith (Fellow)  Requesting Provider: SHAY BASHIR  Medicines:           Monitored Anesthesia Care  Complications:       No immediate complications.  ____________________________________________________________________________________________________  Procedure:           Pre-Anesthesia Assessment:                       - Monitored anesthesia care under the supervision of an anesthesiologist was                        determined to be medically necessary for this procedure based on review of                        the patient's medical history, medications, and prior anesthesia history.                       After obtaining informed consent, the endoscope was passed under direct                        vision. Throughout the procedure, the patient's blood pressure, pulse, and                        oxygen saturations were monitoredcontinuously. The Endoscope was introduced                        through the mouth, and advanced to the second part of duodenum. The Endoscope                        was introduced through the mouth, and advanced to the lower third of           esophagus. The upper GI endoscopy was accomplished without difficulty. The                        patient tolerated the procedure well.                                                                                                        Findings:       An area of stenosis was found 35 cm from the incisors at the GE junction. This could not be        traversed with a standard upper endoscope. A transnasal scope was used to traverse the        stenosis.       The exam of the esophagus wasotherwise normal.       Mucosal changes characterized by congestion, erythema and nodularity were found in the        cardia, in the gastric fundus and in the gastric body. The abnormal area spanned 35cm to        42cm. Biopsies were taken with a cold forceps for histology. Estimated blood loss: none.       The exam of the stomach was otherwise normal.       A diverticulum was found in the second part of the duodenum.       The exam of the duodenum was otherwise normal.                                                                              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 Lumen is                        approximately 5-6 mm in diameter.                       - 6-7cm long segment of congested, erythematous and nodular mucosa in the                        cardia, gastric fundus and proximal gastric body. Appearanceis suspicious                        for linitus plastica or lymphoma. Biopsied.                       - Duodenal diverticulum.  Recommendation:      - [facility]                       - Await pathology results.                       - Obtain a CT of the chest abdomen and pelvis with IV contrast for staging                        purposes                       - The patient states she can tolerate liquids, careful trial of liquid diet                        with high calorie supplements. Nutrition evaluation.                       - Discussed with Dr. Bashir.   Surgical Pathology Report (03.13.18 @ 17:04)    Surgical Pathology Report:   ACCESSION No:  [id number]    LESLI OSMAN                       1  Surgical Final Report    Final Diagnosis  Proximal stomach, endoscopic biopsy:  - Invasive adenocarcinoma, poorly differentiated, see  comment  - Negative for H. pylori (Warthin-Starry stain)    Comment:  Immunohistochemical stain, HER2/ÁNGEL, is being performed and the  result will be reported as an addendum.      Vitamin B12, Serum (03.13.18 @ 07:29)    Vitamin B12, Serum: 915: Note: Reference Range Change on 12/18/2017. pg/mL Patient is a 88y old  Female who presents with a chief complaint of unable to swallow (12 Mar 2018 20:56)      HPI:  87 yo F sent in by her gastroenterologist (Dr. Waldrop) for ongoing difficulty swallowing and failure to thrive for last 3-4 weeks. Full EGD was unable to be obtained secondary to severity of narrowing. Pt reports about 10 pounds of unintentional weight loss over the last 1 month. Able to swallow solids and liquids but often "choking" with copious amount of non bloody phlegm and does not believe she is able to adequately eat or hydrate. (12 Mar 2018 20:56). Patient reports retrosternal discomfort. she had endoscopy - narrowing at GE junction and linitis picture - biopsy - poorly differentiated adenocarcinoma. CT CAP done - 1.9 cm RUL lung nodule, Left adrenal mass inseparable from infiltrating soft tissue   density contacting the anterior and left aspect of the aorta and the posterior aspect of the gastroesophageal junction, pancreatic tail nodule.  Patient has passive smoking exposure -  was a heavy smoker - had larynx cancer.  No headaches, vison, gait c/o.    c/o pain in chest - better with morphine  does not like taste of vanilla flavoured aupplements  No fevers/chills      REVIEW OF SYSTEMS:    CONSTITUTIONAL: No weakness, fevers or chills  EYES/ENT: No visual changes;  No vertigo or throat pain   NECK: No pain or stiffness  RESPIRATORY: No wheezing, hemoptysis; No shortness of breath.  cough +  CARDIOVASCULAR: No chest discomfort.  palpitations +  GASTROINTESTINAL: No abdominal or epigastric pain. No nausea, vomiting, or hematemesis; No diarrhea or constipation. No melena or hematochezia.  GENITOURINARY: No dysuria, frequency or hematuria  NEUROLOGICAL: No numbness or weakness  SKIN: No itching, burning, rashes, or lesions     PHYSICAL EXAM  General: adult in NAD  HEENT: clear oropharynx, anicteric sclera, pink conjunctiva  Neck: supple  CV: normal S1/S2 with no murmur rubs or gallops  Lungs: positive air movement b/l ant lungs,clear to auscultation, no wheezes, no rales  Abdomen: soft non-tender non-distended, no hepatosplenomegaly  Ext: no clubbing cyanosis or edema  Skin: no rashes and no petechiae  Neuro: alert and oriented X 4, no focal deficits        MEDICATIONS  (STANDING):  amLODIPine   Tablet 10 milliGRAM(s) Oral at bedtime  heparin  Injectable 5000 Unit(s) SubCutaneous every 12 hours  hydrochlorothiazide 12.5 milliGRAM(s) Oral daily  latanoprost 0.005% Ophthalmic Solution 1 Drop(s) Both EYES at bedtime  levothyroxine 75 MICROGram(s) Oral daily  lisinopril 20 milliGRAM(s) Oral at bedtime  losartan 100 milliGRAM(s) Oral daily  pantoprazole  Injectable 40 milliGRAM(s) IV Push every 12 hours  sodium chloride 0.9%. 1000 milliLiter(s) (50 mL/Hr) IV Continuous <Continuous>    MEDICATIONS  (PRN):  morphine  - Injectable 2 milliGRAM(s) IV Push every 6 hours PRN Severe Pain (7 - 10)  ondansetron Injectable 4 milliGRAM(s) IV Push every 6 hours PRN Nausea and/or Vomiting      Allergies    No Known Allergies    Intolerances        Vital Signs Last 24 Hrs  T(C): 36.4 (18 Mar 2018 09:52), Max: 37.2 (17 Mar 2018 20:20)  T(F): 97.5 (18 Mar 2018 09:52), Max: 98.9 (17 Mar 2018 20:20)  HR: 90 (18 Mar 2018 09:52) (89 - 99)  BP: 115/72 (18 Mar 2018 09:52) (115/72 - 139/74)  BP(mean): --  RR: 18 (18 Mar 2018 09:52) (16 - 18)  SpO2: 96% (18 Mar 2018 09:52) (95% - 98%)      LABS:                          14.0   11.92 )-----------( 294      ( 17 Mar 2018 08:29 )             38.8         Mean Cell Volume : 88.0 fl  Mean Cell Hemoglobin : 31.7 pg  Mean Cell Hemoglobin Concentration : 36.1 gm/dL      03-17    137  |  96  |  12  ----------------------------<  153<H>  3.4<L>   |  22  |  0.63    Ca    9.6      17 Mar 2018 05:54    Carcinoembryonic Antigen (03.16.18 @ 07:44)    Carcinoembryonic Antigen: 2.4:     Cancer Antigen, 125 (03.16.18 @ 07:44)    Cancer Antigen, 125: 15:    Cancer Antigen, GI Ca 19-9 (03.16.18 @ 07:44)    Cancer Antigen, GI Ca 19-9: 15.5        < from: CT Abdomen and Pelvis w/ Oral Cont and w/ IV Cont (03.14.18 @ 17:42) >    EXAM:  CT CHEST IC                          EXAM:  CT ABDOMEN AND PELVIS OC IC                            PROCEDURE DATE:  03/14/2018            INTERPRETATION:  CLINICAL INFORMATION: Dysphasia, found to have severe   distal esophageal stricture. Rule out tumor.    COMPARISON: None.    PROCEDURE:       FINDINGS:    CHEST:     LUNGS AND LARGE AIRWAYS: Patent central airways. There is a 1.9 cm   partially groundglass spiculated lesion in the right upper lobe which is   nonspecific but suspicious for malignancy. There is a 6 mm nodule in the   left lower lobe on series 3 image 54. There is bilateral lower lobe   linear atelectasis.  PLEURA: No pleural effusion.  VESSELS: Aortic and coronary artery calcifications.  HEART: Heart size is normal. No pericardial effusion.  MEDIASTINUM AND KASSI: No lymphadenopathy.  CHEST WALL AND LOWER NECK: Enlarged and heterogeneously enhancing left   lobe of the thyroid gland suggestive of goiter.    ABDOMEN AND PELVIS:    LIVER: Within normal limits.  BILE DUCTS: Normal caliber.  GALLBLADDER: Within normal limits.  SPLEEN: Within normal limits.  PANCREAS: 1.4 cm peripherally enhancing lesion in the tail the pancreas.  ADRENALS: Left adrenal mass inseparable from infiltrating soft tissue   density contacting the anterior and left aspect of the aorta and the   posterior aspect of the gastroesophageal junction.  KIDNEYS/URETERS: Bilateral renal cysts and subcentimeter hypodense   lesions which are too small to characterize.     BLADDER: Within normal limits.  REPRODUCTIVE ORGANS: Status post hysterectomy.    BOWEL: There is diffuse thickening of the distal esophagus spanning   approximately 7 cm with soft tissue mass extending posteriorly from the   thickened gastroesophageal junction as described above. These findings   are suggestive of malignancy.  PERITONEUM: No ascites.  VESSELS:  Within normal limits.  RETROPERITONEUM: Soft tissue infiltration as above.  ABDOMINAL WALL: Within normal limits.  BONES: Bilateral infraspinatus calcific tendinosis. Marked scoliosis with   subluxations and degenerative changes.    IMPRESSION:   Thickened distal esophagus spanning approximately 7 mm with infiltrative   mass extending posteriorly from the gastroesophageal junction and   contacting the anterior and left lateral aspects of the aorta and   inseparable from a left adrenal mass. 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    1.9 cm spiculated right upper lobe nodule either representing a primary   malignancy or metastatic disease.    1.4 cm peripherally enhancing lesion within the tail the pancreas which   is also likely neoplastic.        < from: Upper Endoscopy (03.13.18 @ 16:28) >    [facility]  ____________________________________________________________________________________________________  Patient Name: Lesli Osman                     MRN: [id number]  Account Number: [id number]                     YOB: 1929  Room: Endoscopy Room 3                           Gender: Female  Attending MD: Mahin Phelan MD                    Procedure Date No Time: 3/13/2018  ____________________________________________________________________________________________________     Procedure:           Upper GI endoscopy  Indications:         Dysphagia x 2 week  Providers:           Mahin Phelan MD, Surinder Smith (Fellow)  Requesting Provider: SHAY BASHIR  Medicines:           Monitored Anesthesia Care  Complications:       No immediate complications.  ____________________________________________________________________________________________________  Procedure:           Pre-Anesthesia Assessment:                       - Monitored anesthesia care under the supervision of an anesthesiologist was                        determined to be medically necessary for this procedure based on review of                        the patient's medical history, medications, and prior anesthesia history.                       After obtaining informed consent, the endoscope was passed under direct                        vision. Throughout the procedure, the patient's blood pressure, pulse, and                        oxygen saturations were monitoredcontinuously. The Endoscope was introduced                        through the mouth, and advanced to the second part of duodenum. The Endoscope                        was introduced through the mouth, and advanced to the lower third of           esophagus. The upper GI endoscopy was accomplished without difficulty. The                        patient tolerated the procedure well.                                                                                                        Findings:       An area of stenosis was found 35 cm from the incisors at the GE junction. This could not be        traversed with a standard upper endoscope. A transnasal scope was used to traverse the        stenosis.       The exam of the esophagus wasotherwise normal.       Mucosal changes characterized by congestion, erythema and nodularity were found in the        cardia, in the gastric fundus and in the gastric body. The abnormal area spanned 35cm to        42cm. Biopsies were taken with a cold forceps for histology. Estimated blood loss: none.       The exam of the stomach was otherwise normal.       A diverticulum was found in the second part of the duodenum.       The exam of the duodenum was otherwise normal.                                                                              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 Lumen is                        approximately 5-6 mm in diameter.                       - 6-7cm long segment of congested, erythematous and nodular mucosa in the                        cardia, gastric fundus and proximal gastric body. Appearanceis suspicious                        for linitus plastica or lymphoma. Biopsied.                       - Duodenal diverticulum.  Recommendation:      - [facility].                       - Await pathology results.                       - Obtain a CT of the chest abdomen and pelvis with IV contrast for staging                        purposes                       - The patient states she can tolerate liquids, careful trial of liquid diet                        with high calorie supplements. Nutrition evaluation.                       - Discussed with Dr. Bashir.   Surgical Pathology Report (03.13.18 @ 17:04)    Surgical Pathology Report:   ACCESSION No:  [id number]    LESLI OSMAN                       1  Surgical Final Report    Final Diagnosis  Proximal stomach, endoscopic biopsy:  - Invasive adenocarcinoma, poorly differentiated, see  comment  - Negative for H. pylori (Warthin-Starry stain)    Comment:  Immunohistochemical stain, HER2/ÁNGEL, is being performed and the  result will be reported as an addendum.      Vitamin B12, Serum (03.13.18 @ 07:29)    Vitamin B12, Serum: 915: Note: Reference Range Change on 12/18/2017. pg/mL

## 2018-03-18 NOTE — PROGRESS NOTE ADULT - ASSESSMENT
89 yo F sent in by her gastroenterologist (Dr. Waldrop) for ongoing difficulty swallowing and failure to thrive secondary to "narrowing" of the esophagus. Full EGD was unable to be obtained secondary to severity of narrowing. Pt reports about 10 pounds of unintentional weight loss over the last 1 month. Able to swallow solids and liquids but often "choking" with copious amount of non bloody phlegm and does not believe she is able to adequately eat or hydrate.    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   Ct scan c/w metastatic disease, lung mass and pancreatic tail leision  Pt is s/p esophageal stent placement.  GI recommendations  #1.  Patient has postprocedure chest discomfort from the pressure of self-expandable metal stent.  No fever, tachycardia, dyspnea.  increase Zofran tp 8  mg IV PRN.  and Morphine 2 mg IV  PRN.  Stent will expand over the next 48 hours, and patient tolerance of stent should improve over the next 72 hours.  May use low dose opiates IV, but avoid nausea/vomiting.    #2.  advance diet as tolerated.    #3.  If patient develops fever, tachycardia, dyspnea, or other worrisome signs, obtain CXR and page GI.    #4.  Proton pump inhibitor (protonix 40 mg IV q12 hours)    #5.  Aspiration precautions.    -consulted medical oncology  - pt will follow up as out pt for pet scan and poss therapy.    htn - c/w home meds    hypothyroid - c/w synthroid , normal  tsh

## 2018-03-18 NOTE — PROGRESS NOTE ADULT - SUBJECTIVE AND OBJECTIVE BOX
Patient is a 88y old  Female who presents with a chief complaint of unable to swallow (12 Mar 2018 20:56)      SUBJECTIVE / OVERNIGHT EVENTS:  pain is controlled, still has nausea.  no vomiting.    MEDICATIONS  (STANDING):  amLODIPine   Tablet 10 milliGRAM(s) Oral at bedtime  heparin  Injectable 5000 Unit(s) SubCutaneous every 12 hours  hydrochlorothiazide 12.5 milliGRAM(s) Oral daily  latanoprost 0.005% Ophthalmic Solution 1 Drop(s) Both EYES at bedtime  levothyroxine 75 MICROGram(s) Oral daily  lisinopril 20 milliGRAM(s) Oral at bedtime  losartan 100 milliGRAM(s) Oral daily  pantoprazole  Injectable 40 milliGRAM(s) IV Push every 12 hours  sodium chloride 0.9%. 1000 milliLiter(s) (50 mL/Hr) IV Continuous <Continuous>    MEDICATIONS  (PRN):  morphine  - Injectable 2 milliGRAM(s) IV Push every 6 hours PRN Severe Pain (7 - 10)  ondansetron Injectable 4 milliGRAM(s) IV Push every 6 hours PRN Nausea and/or Vomiting      Vital Signs Last 24 Hrs  T(C): 36.4 (18 Mar 2018 09:52), Max: 37.2 (17 Mar 2018 20:20)  T(F): 97.5 (18 Mar 2018 09:52), Max: 98.9 (17 Mar 2018 20:20)  HR: 90 (18 Mar 2018 09:52) (89 - 99)  BP: 115/72 (18 Mar 2018 09:52) (115/72 - 139/74)  BP(mean): --  RR: 18 (18 Mar 2018 09:52) (16 - 18)  SpO2: 96% (18 Mar 2018 09:52) (95% - 98%)  CAPILLARY BLOOD GLUCOSE        I&O's Summary    17 Mar 2018 07:01  -  18 Mar 2018 07:00  --------------------------------------------------------  IN: 980 mL / OUT: 0 mL / NET: 980 mL    18 Mar 2018 07:01  -  18 Mar 2018 11:50  --------------------------------------------------------  IN: 200 mL / OUT: 0 mL / NET: 200 mL        PHYSICAL EXAM:  GENERAL: NAD, well-developed  HEAD:  Atraumatic, Normocephalic  EYES: EOMI, PERRLA, conjunctiva and sclera clear  NECK: Supple, No JVD  CHEST/LUNG: Clear to auscultation bilaterally; No wheeze  HEART: Regular rate and rhythm; No murmurs, rubs, or gallops  ABDOMEN: Soft, Nontender, Nondistended; Bowel sounds present  EXTREMITIES:  2+ Peripheral Pulses, No clubbing, cyanosis, or edema  PSYCH: AAOx3  NEUROLOGY: non-focal  SKIN: No rashes or lesions    LABS:                        14.0   11.92 )-----------( 294      ( 17 Mar 2018 08:29 )             38.8     03-17    137  |  96  |  12  ----------------------------<  153<H>  3.4<L>   |  22  |  0.63    Ca    9.6      17 Mar 2018 05:54                RADIOLOGY & ADDITIONAL TESTS:    Imaging Personally Reviewed:    Consultant(s) Notes Reviewed:      Care Discussed with Consultants/Other Providers:

## 2018-03-19 LAB
ANION GAP SERPL CALC-SCNC: 13 MMOL/L — SIGNIFICANT CHANGE UP (ref 5–17)
BUN SERPL-MCNC: 18 MG/DL — SIGNIFICANT CHANGE UP (ref 7–23)
CALCIUM SERPL-MCNC: 9.2 MG/DL — SIGNIFICANT CHANGE UP (ref 8.4–10.5)
CHLORIDE SERPL-SCNC: 97 MMOL/L — SIGNIFICANT CHANGE UP (ref 96–108)
CO2 SERPL-SCNC: 28 MMOL/L — SIGNIFICANT CHANGE UP (ref 22–31)
CREAT SERPL-MCNC: 0.59 MG/DL — SIGNIFICANT CHANGE UP (ref 0.5–1.3)
GLUCOSE SERPL-MCNC: 130 MG/DL — HIGH (ref 70–99)
HCT VFR BLD CALC: 40.5 % — SIGNIFICANT CHANGE UP (ref 34.5–45)
HGB BLD-MCNC: 13.9 G/DL — SIGNIFICANT CHANGE UP (ref 11.5–15.5)
MAGNESIUM SERPL-MCNC: 1.7 MG/DL — SIGNIFICANT CHANGE UP (ref 1.6–2.6)
MCHC RBC-ENTMCNC: 31.8 PG — SIGNIFICANT CHANGE UP (ref 27–34)
MCHC RBC-ENTMCNC: 34.3 GM/DL — SIGNIFICANT CHANGE UP (ref 32–36)
MCV RBC AUTO: 92.7 FL — SIGNIFICANT CHANGE UP (ref 80–100)
NRBC # BLD: 0 /100 WBCS — SIGNIFICANT CHANGE UP (ref 0–0)
PHOSPHATE SERPL-MCNC: 2.1 MG/DL — LOW (ref 2.5–4.5)
PLATELET # BLD AUTO: 276 K/UL — SIGNIFICANT CHANGE UP (ref 150–400)
POTASSIUM SERPL-MCNC: 3.1 MMOL/L — LOW (ref 3.5–5.3)
POTASSIUM SERPL-SCNC: 3.1 MMOL/L — LOW (ref 3.5–5.3)
RBC # BLD: 4.37 M/UL — SIGNIFICANT CHANGE UP (ref 3.8–5.2)
RBC # FLD: 13.8 % — SIGNIFICANT CHANGE UP (ref 10.3–14.5)
SODIUM SERPL-SCNC: 138 MMOL/L — SIGNIFICANT CHANGE UP (ref 135–145)
WBC # BLD: 14.54 K/UL — HIGH (ref 3.8–10.5)
WBC # FLD AUTO: 14.54 K/UL — HIGH (ref 3.8–10.5)

## 2018-03-19 RX ORDER — POTASSIUM PHOSPHATE, MONOBASIC POTASSIUM PHOSPHATE, DIBASIC 236; 224 MG/ML; MG/ML
15 INJECTION, SOLUTION INTRAVENOUS ONCE
Qty: 0 | Refills: 0 | Status: COMPLETED | OUTPATIENT
Start: 2018-03-19 | End: 2018-03-19

## 2018-03-19 RX ORDER — POTASSIUM CHLORIDE 20 MEQ
40 PACKET (EA) ORAL EVERY 4 HOURS
Qty: 0 | Refills: 0 | Status: COMPLETED | OUTPATIENT
Start: 2018-03-19 | End: 2018-03-19

## 2018-03-19 RX ADMIN — PANTOPRAZOLE SODIUM 40 MILLIGRAM(S): 20 TABLET, DELAYED RELEASE ORAL at 05:37

## 2018-03-19 RX ADMIN — HEPARIN SODIUM 5000 UNIT(S): 5000 INJECTION INTRAVENOUS; SUBCUTANEOUS at 05:37

## 2018-03-19 RX ADMIN — LATANOPROST 1 DROP(S): 0.05 SOLUTION/ DROPS OPHTHALMIC; TOPICAL at 21:45

## 2018-03-19 RX ADMIN — PANTOPRAZOLE SODIUM 40 MILLIGRAM(S): 20 TABLET, DELAYED RELEASE ORAL at 18:53

## 2018-03-19 RX ADMIN — Medication 12.5 MILLIGRAM(S): at 05:37

## 2018-03-19 RX ADMIN — Medication 75 MICROGRAM(S): at 05:37

## 2018-03-19 RX ADMIN — HEPARIN SODIUM 5000 UNIT(S): 5000 INJECTION INTRAVENOUS; SUBCUTANEOUS at 17:45

## 2018-03-19 RX ADMIN — LOSARTAN POTASSIUM 100 MILLIGRAM(S): 100 TABLET, FILM COATED ORAL at 05:37

## 2018-03-19 RX ADMIN — ONDANSETRON 8 MILLIGRAM(S): 8 TABLET, FILM COATED ORAL at 16:57

## 2018-03-19 RX ADMIN — POTASSIUM PHOSPHATE, MONOBASIC POTASSIUM PHOSPHATE, DIBASIC 62.5 MILLIMOLE(S): 236; 224 INJECTION, SOLUTION INTRAVENOUS at 17:44

## 2018-03-19 RX ADMIN — LISINOPRIL 20 MILLIGRAM(S): 2.5 TABLET ORAL at 21:45

## 2018-03-19 RX ADMIN — AMLODIPINE BESYLATE 10 MILLIGRAM(S): 2.5 TABLET ORAL at 21:45

## 2018-03-19 RX ADMIN — Medication 40 MILLIEQUIVALENT(S): at 21:46

## 2018-03-19 RX ADMIN — Medication 40 MILLIEQUIVALENT(S): at 17:44

## 2018-03-19 NOTE — PROGRESS NOTE ADULT - ASSESSMENT
89 yo F sent in by her gastroenterologist (Dr. Waldrop) for ongoing difficulty swallowing and failure to thrive secondary to "narrowing" of the esophagus. Full EGD was unable to be obtained secondary to severity of narrowing. Pt reports about 10 pounds of unintentional weight loss over the last 1 month. Able to swallow solids and liquids but often "choking" with copious amount of non bloody phlegm and does not believe she is able to adequately eat or hydrate.    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   Ct scan c/w metastatic disease, lung mass and pancreatic tail leision  Pt is s/p esophageal stent placement.  GI recommendations  #1.  Patient has postprocedure chest discomfort from the pressure of self-expandable metal stent.  No fever, tachycardia, dyspnea.  increase Zofran tp 8  mg IV PRN.  and Morphine 2 mg IV  PRN.     May use low dose opiates IV, but avoid nausea/vomiting.    #2.  soft diet as tolerated.    #3.  If patient develops fever, tachycardia, dyspnea, or other worrisome signs, obtain CXR and page GI.    #4.  Proton pump inhibitor (protonix 40 mg IV q12 hours)    #5.  Aspiration precautions.    -consulted medical oncology  - pt will follow up as out pt for pet scan and poss therapy.    htn - c/w home meds    hypothyroid - c/w synthroid , normal  tsh    d/c planning 87 yo F sent in by her gastroenterologist (Dr. Waldrop) for ongoing difficulty swallowing and failure to thrive secondary to "narrowing" of the esophagus. Full EGD was unable to be obtained secondary to severity of narrowing. Pt reports about 10 pounds of unintentional weight loss over the last 1 month. Able to swallow solids and liquids but often "choking" with copious amount of non bloody phlegm and does not believe she is able to adequately eat or hydrate.    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   Ct scan c/w metastatic disease, lung mass and pancreatic tail leision  Pt is s/p esophageal stent placement.  GI recommendations  #1.  Patient has postprocedure chest discomfort from the pressure of self-expandable metal stent.  No fever, tachycardia, dyspnea.  increase Zofran tp 8  mg IV PRN.  and Morphine 2 mg IV  PRN.     May use low dose opiates IV, but avoid nausea/vomiting.    #2.  soft diet as tolerated.    #3.  If patient develops fever, tachycardia, dyspnea, or other worrisome signs, obtain CXR and page GI.    #4.  Proton pump inhibitor (protonix 40 mg IV q12 hours)    #5.  Aspiration precautions.    -consulted medical oncology  - pt will follow up as out pt for pet scan and poss therapy.    - supplement potassium and phos    htn - c/w home meds    hypothyroid - c/w synthroid , normal  tsh    d/c planning

## 2018-03-19 NOTE — PROGRESS NOTE ADULT - SUBJECTIVE AND OBJECTIVE BOX
Patient is a 88y old  Female who presents with a chief complaint of unable to swallow (12 Mar 2018 20:56)      SUBJECTIVE / OVERNIGHT EVENTS:  chest pain much improved.  MEDICATIONS  (STANDING):  amLODIPine   Tablet 10 milliGRAM(s) Oral at bedtime  heparin  Injectable 5000 Unit(s) SubCutaneous every 12 hours  hydrochlorothiazide 12.5 milliGRAM(s) Oral daily  latanoprost 0.005% Ophthalmic Solution 1 Drop(s) Both EYES at bedtime  levothyroxine 75 MICROGram(s) Oral daily  lisinopril 20 milliGRAM(s) Oral at bedtime  losartan 100 milliGRAM(s) Oral daily  pantoprazole  Injectable 40 milliGRAM(s) IV Push every 12 hours  sodium chloride 0.9%. 1000 milliLiter(s) (50 mL/Hr) IV Continuous <Continuous>    MEDICATIONS  (PRN):  morphine  - Injectable 2 milliGRAM(s) IV Push every 6 hours PRN Severe Pain (7 - 10)  ondansetron Injectable 8 milliGRAM(s) IV Push every 6 hours PRN Nausea and/or Vomiting              PHYSICAL EXAM:  GENERAL: NAD, well-developed  HEAD:  Atraumatic, Normocephalic  EYES: EOMI, PERRLA, conjunctiva and sclera anicteric  NECK: Supple, No JVD  CHEST/LUNG: Clear to auscultation bilaterally; No wheeze  HEART: Regular rate and rhythm; No murmurs, rubs, or gallops  ABDOMEN: Soft, Nontender, Nondistended; Bowel sounds present, no hepatosplenomegaly, no rebound or guarding  EXTREMITIES:  2+ Peripheral Pulses, No clubbing, cyanosis, or edema  PSYCH: AAOx3  NEUROLOGY: non-focal, no asterixis  SKIN: No rashes or lesion    LABS:                        13.9   14.54 )-----------( 276      ( 19 Mar 2018 09:16 )             40.5     03-19    138  |  97  |  18  ----------------------------<  130<H>  3.1<L>   |  28  |  0.59    Ca    9.2      19 Mar 2018 07:20  Phos  2.1     03-19  Mg     1.7     03-19                  RADIOLOGY & ADDITIONAL TESTS:

## 2018-03-19 NOTE — PROGRESS NOTE ADULT - SUBJECTIVE AND OBJECTIVE BOX
Patient is a 88y old  Female who presents with a chief complaint of unable to swallow (12 Mar 2018 20:56)      SUBJECTIVE / OVERNIGHT EVENTS:  feels nauseous.  tolerating diet.  pain is controlled.    MEDICATIONS  (STANDING):  amLODIPine   Tablet 10 milliGRAM(s) Oral at bedtime  heparin  Injectable 5000 Unit(s) SubCutaneous every 12 hours  hydrochlorothiazide 12.5 milliGRAM(s) Oral daily  latanoprost 0.005% Ophthalmic Solution 1 Drop(s) Both EYES at bedtime  levothyroxine 75 MICROGram(s) Oral daily  lisinopril 20 milliGRAM(s) Oral at bedtime  losartan 100 milliGRAM(s) Oral daily  pantoprazole  Injectable 40 milliGRAM(s) IV Push every 12 hours  potassium chloride   Solution 40 milliEquivalent(s) Oral every 4 hours  potassium phosphate IVPB 15 milliMole(s) IV Intermittent once  sodium chloride 0.9%. 1000 milliLiter(s) (50 mL/Hr) IV Continuous <Continuous>    MEDICATIONS  (PRN):  morphine  - Injectable 2 milliGRAM(s) IV Push every 6 hours PRN Severe Pain (7 - 10)  ondansetron Injectable 8 milliGRAM(s) IV Push every 6 hours PRN Nausea and/or Vomiting      Vital Signs Last 24 Hrs  T(C): 36.6 (19 Mar 2018 08:10), Max: 36.9 (18 Mar 2018 22:01)  T(F): 97.9 (19 Mar 2018 08:10), Max: 98.4 (18 Mar 2018 22:01)  HR: 86 (19 Mar 2018 08:10) (86 - 90)  BP: 123/72 (19 Mar 2018 08:10) (121/72 - 133/76)  BP(mean): --  RR: 18 (19 Mar 2018 08:10) (16 - 18)  SpO2: 95% (19 Mar 2018 08:10) (95% - 95%)  CAPILLARY BLOOD GLUCOSE        I&O's Summary    18 Mar 2018 07:01  -  19 Mar 2018 07:00  --------------------------------------------------------  IN: 980 mL / OUT: 0 mL / NET: 980 mL    19 Mar 2018 07:01  -  19 Mar 2018 11:31  --------------------------------------------------------  IN: 200 mL / OUT: 0 mL / NET: 200 mL        PHYSICAL EXAM:  GENERAL: NAD, well-developed  HEAD:  Atraumatic, Normocephalic  EYES: EOMI, PERRLA, conjunctiva and sclera clear  NECK: Supple, No JVD  CHEST/LUNG: Clear to auscultation bilaterally; No wheeze  HEART: Regular rate and rhythm; No murmurs, rubs, or gallops  ABDOMEN: Soft, Nontender, Nondistended; Bowel sounds present  EXTREMITIES:  2+ Peripheral Pulses, No clubbing, cyanosis, or edema  PSYCH: AAOx3  NEUROLOGY: non-focal  SKIN: No rashes or lesions    LABS:                        13.9   14.54 )-----------( 276      ( 19 Mar 2018 09:16 )             40.5     03-19    138  |  97  |  18  ----------------------------<  130<H>  3.1<L>   |  28  |  0.59    Ca    9.2      19 Mar 2018 07:20  Phos  2.1     03-19  Mg     1.7     03-19                RADIOLOGY & ADDITIONAL TESTS:    Imaging Personally Reviewed:    Consultant(s) Notes Reviewed:      Care Discussed with Consultants/Other Providers:

## 2018-03-19 NOTE — PROGRESS NOTE ADULT - PROBLEM SELECTOR PLAN 1
Await further path w/up - Her2 testing  lung nodule could be metastasis or 2nd primary  s/p stent placement - just starting soft diet today  Evaluate adequacy of caloric intake  Likely will benefit form chemotherapy or immunotherapy (based on path findings) as good PS and no significant co morbidities  Patient anxious about avoiding side effects of chemo - explained better supportive care now than when her mother/ treated for cancer  Ct home meds  Nutrition support - if adequate po intake and pain control - then discharge   PT evaluation  Discharge planning  planning - outpatient PET scan and F/up for decision re. systemic treatment once

## 2018-03-19 NOTE — PROGRESS NOTE ADULT - PROBLEM SELECTOR PLAN 2
s/p expandable stent placement  aggressive pain control  diet per GI - d/w dietician - alternative flavor options

## 2018-03-19 NOTE — PROGRESS NOTE ADULT - ASSESSMENT
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 Wire guided fully covered Endomaxx stent placed under endoscopic and fluoroscopic guidance    Recommendation:  - trend CBC, transfuse as needed  - stent will expand over the next 48 hours, and patient tolerance of stent should improve over the next 72 hours, may use low dose opiates IV, but avoid nausea/vomiting  - clear liquid diet as tolerated  - if patient develops fever, tachycardia, dyspnea, or other worrisome signs, obtain CXR and page GI stat   - continue pantoprazole 40 mg IV q12 hours  - aspiration precautions  - supportive care as per primary team    GI team will continue to follow.  Thank you for the consult. 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 Wire guided fully covered Endomaxx stent placed under endoscopic and fluoroscopic guidance    Recommendation:  - trend CBC, transfuse as needed      - if patient develops fever, tachycardia, dyspnea, or other worrisome signs, obtain CXR and page GI stat   - continue pantoprazole 40 mg IV q12 hours  - aspiration precautions  - supportive care as per primary team

## 2018-03-19 NOTE — PROGRESS NOTE ADULT - SUBJECTIVE AND OBJECTIVE BOX
Pt is seen and examined  pt is awake and lying in bed  Taking small amount PO  Not ambulating as confined to bed because of fall risk  Having some pain in the epigastrum    MEDICATIONS  (STANDING):  amLODIPine   Tablet 10 milliGRAM(s) Oral at bedtime  heparin  Injectable 5000 Unit(s) SubCutaneous every 12 hours  hydrochlorothiazide 12.5 milliGRAM(s) Oral daily  latanoprost 0.005% Ophthalmic Solution 1 Drop(s) Both EYES at bedtime  levothyroxine 75 MICROGram(s) Oral daily  lisinopril 20 milliGRAM(s) Oral at bedtime  losartan 100 milliGRAM(s) Oral daily  pantoprazole  Injectable 40 milliGRAM(s) IV Push every 12 hours  potassium chloride   Solution 40 milliEquivalent(s) Oral every 4 hours  potassium phosphate IVPB 15 milliMole(s) IV Intermittent once  sodium chloride 0.9%. 1000 milliLiter(s) (50 mL/Hr) IV Continuous <Continuous>    MEDICATIONS  (PRN):  morphine  - Injectable 2 milliGRAM(s) IV Push every 6 hours PRN Severe Pain (7 - 10)  ondansetron Injectable 8 milliGRAM(s) IV Push every 6 hours PRN Nausea and/or Vomiting      Allergies    No Known Allergies    Intolerances  Vital Signs Last 24 Hrs  T(C): 36.6 (19 Mar 2018 08:10), Max: 36.9 (18 Mar 2018 22:01)  T(F): 97.9 (19 Mar 2018 08:10), Max: 98.4 (18 Mar 2018 22:01)  HR: 86 (19 Mar 2018 08:10) (86 - 90)  BP: 123/72 (19 Mar 2018 08:10) (121/72 - 133/76)  BP(mean): --  RR: 18 (19 Mar 2018 08:10) (16 - 18)  SpO2: 95% (19 Mar 2018 08:10) (95% - 95%)    PHYSICAL EXAM  General: adult in NAD  HEENT: clear oropharynx, anicteric sclera, pink conjunctiva  Neck: supple  CV: normal S1/S2 with no murmur rubs or gallops  Lungs: positive air movement b/l ant lungs,clear to auscultation, no wheezes, no rales  Abdomen: soft non-tender non-distended, no hepatosplenomegaly  Ext: no clubbing cyanosis or edema  Skin: no rashes and no petechiae  Neuro: alert and oriented X 4, no focal deficits  LABS:                          13.9   14.54 )-----------( 276      ( 19 Mar 2018 09:16 )             40.5         Mean Cell Volume : 92.7 fl  Mean Cell Hemoglobin : 31.8 pg  Mean Cell Hemoglobin Concentration : 34.3 gm/dL  Auto Neutrophil # : x  Auto Lymphocyte # : x  Auto Monocyte # : x  Auto Eosinophil # : x  Auto Basophil # : x  Auto Neutrophil % : x  Auto Lymphocyte % : x  Auto Monocyte % : x  Auto Eosinophil % : x  Auto Basophil % : x      03-19    138  |  97  |  18  ----------------------------<  130<H>  3.1<L>   |  28  |  0.59    Ca    9.2      19 Mar 2018 07:20  Phos  2.1     03-19  Mg     1.7     03-19    Path reviewed -  adenocarcinoma GE junctioin  CTscans reviewed.

## 2018-03-20 ENCOUNTER — TRANSCRIPTION ENCOUNTER (OUTPATIENT)
Age: 83
End: 2018-03-20

## 2018-03-20 VITALS
RESPIRATION RATE: 18 BRPM | SYSTOLIC BLOOD PRESSURE: 107 MMHG | DIASTOLIC BLOOD PRESSURE: 68 MMHG | OXYGEN SATURATION: 95 % | HEART RATE: 94 BPM | TEMPERATURE: 98 F

## 2018-03-20 LAB
ANION GAP SERPL CALC-SCNC: 9 MMOL/L — SIGNIFICANT CHANGE UP (ref 5–17)
BUN SERPL-MCNC: 19 MG/DL — SIGNIFICANT CHANGE UP (ref 7–23)
CALCIUM SERPL-MCNC: 9.4 MG/DL — SIGNIFICANT CHANGE UP (ref 8.4–10.5)
CHLORIDE SERPL-SCNC: 100 MMOL/L — SIGNIFICANT CHANGE UP (ref 96–108)
CO2 SERPL-SCNC: 30 MMOL/L — SIGNIFICANT CHANGE UP (ref 22–31)
CREAT SERPL-MCNC: 0.59 MG/DL — SIGNIFICANT CHANGE UP (ref 0.5–1.3)
GLUCOSE SERPL-MCNC: 129 MG/DL — HIGH (ref 70–99)
HCT VFR BLD CALC: 37.9 % — SIGNIFICANT CHANGE UP (ref 34.5–45)
HGB BLD-MCNC: 13.6 G/DL — SIGNIFICANT CHANGE UP (ref 11.5–15.5)
MAGNESIUM SERPL-MCNC: 1.7 MG/DL — SIGNIFICANT CHANGE UP (ref 1.6–2.6)
MCHC RBC-ENTMCNC: 32.8 PG — SIGNIFICANT CHANGE UP (ref 27–34)
MCHC RBC-ENTMCNC: 35.9 GM/DL — SIGNIFICANT CHANGE UP (ref 32–36)
MCV RBC AUTO: 91.3 FL — SIGNIFICANT CHANGE UP (ref 80–100)
NRBC # BLD: 0 /100 WBCS — SIGNIFICANT CHANGE UP (ref 0–0)
PHOSPHATE SERPL-MCNC: 2.1 MG/DL — LOW (ref 2.5–4.5)
PLATELET # BLD AUTO: 285 K/UL — SIGNIFICANT CHANGE UP (ref 150–400)
POTASSIUM SERPL-MCNC: 3.8 MMOL/L — SIGNIFICANT CHANGE UP (ref 3.5–5.3)
POTASSIUM SERPL-SCNC: 3.8 MMOL/L — SIGNIFICANT CHANGE UP (ref 3.5–5.3)
RBC # BLD: 4.15 M/UL — SIGNIFICANT CHANGE UP (ref 3.8–5.2)
RBC # FLD: 13.9 % — SIGNIFICANT CHANGE UP (ref 10.3–14.5)
SODIUM SERPL-SCNC: 139 MMOL/L — SIGNIFICANT CHANGE UP (ref 135–145)
WBC # BLD: 13.18 K/UL — HIGH (ref 3.8–10.5)
WBC # FLD AUTO: 13.18 K/UL — HIGH (ref 3.8–10.5)

## 2018-03-20 PROCEDURE — 84100 ASSAY OF PHOSPHORUS: CPT

## 2018-03-20 PROCEDURE — C1769: CPT

## 2018-03-20 PROCEDURE — 84132 ASSAY OF SERUM POTASSIUM: CPT

## 2018-03-20 PROCEDURE — 99285 EMERGENCY DEPT VISIT HI MDM: CPT

## 2018-03-20 PROCEDURE — 80048 BASIC METABOLIC PNL TOTAL CA: CPT

## 2018-03-20 PROCEDURE — 88305 TISSUE EXAM BY PATHOLOGIST: CPT

## 2018-03-20 PROCEDURE — 83735 ASSAY OF MAGNESIUM: CPT

## 2018-03-20 PROCEDURE — 71260 CT THORAX DX C+: CPT

## 2018-03-20 PROCEDURE — 86850 RBC ANTIBODY SCREEN: CPT

## 2018-03-20 PROCEDURE — C1874: CPT

## 2018-03-20 PROCEDURE — 80053 COMPREHEN METABOLIC PANEL: CPT

## 2018-03-20 PROCEDURE — 88360 TUMOR IMMUNOHISTOCHEM/MANUAL: CPT

## 2018-03-20 PROCEDURE — 71045 X-RAY EXAM CHEST 1 VIEW: CPT

## 2018-03-20 PROCEDURE — 82746 ASSAY OF FOLIC ACID SERUM: CPT

## 2018-03-20 PROCEDURE — 86304 IMMUNOASSAY TUMOR CA 125: CPT

## 2018-03-20 PROCEDURE — 82607 VITAMIN B-12: CPT

## 2018-03-20 PROCEDURE — 82378 CARCINOEMBRYONIC ANTIGEN: CPT

## 2018-03-20 PROCEDURE — 86301 IMMUNOASSAY TUMOR CA 19-9: CPT

## 2018-03-20 PROCEDURE — 85730 THROMBOPLASTIN TIME PARTIAL: CPT

## 2018-03-20 PROCEDURE — 85610 PROTHROMBIN TIME: CPT

## 2018-03-20 PROCEDURE — 85027 COMPLETE CBC AUTOMATED: CPT

## 2018-03-20 PROCEDURE — 84443 ASSAY THYROID STIM HORMONE: CPT

## 2018-03-20 PROCEDURE — 87040 BLOOD CULTURE FOR BACTERIA: CPT

## 2018-03-20 PROCEDURE — 74177 CT ABD & PELVIS W/CONTRAST: CPT

## 2018-03-20 PROCEDURE — 88312 SPECIAL STAINS GROUP 1: CPT

## 2018-03-20 PROCEDURE — 76000 FLUOROSCOPY <1 HR PHYS/QHP: CPT

## 2018-03-20 PROCEDURE — 86901 BLOOD TYPING SEROLOGIC RH(D): CPT

## 2018-03-20 PROCEDURE — 86900 BLOOD TYPING SEROLOGIC ABO: CPT

## 2018-03-20 PROCEDURE — 82962 GLUCOSE BLOOD TEST: CPT

## 2018-03-20 RX ORDER — LACTULOSE 10 G/15ML
20 SOLUTION ORAL ONCE
Qty: 0 | Refills: 0 | Status: COMPLETED | OUTPATIENT
Start: 2018-03-20 | End: 2018-03-20

## 2018-03-20 RX ORDER — SODIUM,POTASSIUM PHOSPHATES 278-250MG
1 POWDER IN PACKET (EA) ORAL THREE TIMES A DAY
Qty: 0 | Refills: 0 | Status: DISCONTINUED | OUTPATIENT
Start: 2018-03-20 | End: 2018-03-20

## 2018-03-20 RX ORDER — OXYCODONE HYDROCHLORIDE 5 MG/1
1 TABLET ORAL
Qty: 30 | Refills: 0 | OUTPATIENT
Start: 2018-03-20 | End: 2018-03-24

## 2018-03-20 RX ORDER — AMLODIPINE BESYLATE 2.5 MG/1
1 TABLET ORAL
Qty: 0 | Refills: 0 | COMMUNITY

## 2018-03-20 RX ORDER — AMLODIPINE BESYLATE 2.5 MG/1
1 TABLET ORAL
Qty: 30 | Refills: 0 | OUTPATIENT
Start: 2018-03-20 | End: 2018-04-18

## 2018-03-20 RX ORDER — AMLODIPINE BESYLATE AND BENAZEPRIL HYDROCHLORIDE 10; 20 MG/1; MG/1
1 CAPSULE ORAL
Qty: 0 | Refills: 0 | COMMUNITY

## 2018-03-20 RX ORDER — LOSARTAN POTASSIUM 100 MG/1
1 TABLET, FILM COATED ORAL
Qty: 30 | Refills: 0 | OUTPATIENT
Start: 2018-03-20 | End: 2018-04-18

## 2018-03-20 RX ORDER — LOSARTAN/HYDROCHLOROTHIAZIDE 100MG-25MG
1 TABLET ORAL
Qty: 0 | Refills: 0 | COMMUNITY

## 2018-03-20 RX ORDER — OXYCODONE HYDROCHLORIDE 5 MG/1
5 TABLET ORAL EVERY 4 HOURS
Qty: 0 | Refills: 0 | Status: DISCONTINUED | OUTPATIENT
Start: 2018-03-20 | End: 2018-03-20

## 2018-03-20 RX ADMIN — Medication 12.5 MILLIGRAM(S): at 06:06

## 2018-03-20 RX ADMIN — LOSARTAN POTASSIUM 100 MILLIGRAM(S): 100 TABLET, FILM COATED ORAL at 06:06

## 2018-03-20 RX ADMIN — MORPHINE SULFATE 2 MILLIGRAM(S): 50 CAPSULE, EXTENDED RELEASE ORAL at 07:03

## 2018-03-20 RX ADMIN — Medication 75 MICROGRAM(S): at 06:06

## 2018-03-20 RX ADMIN — LACTULOSE 20 GRAM(S): 10 SOLUTION ORAL at 12:19

## 2018-03-20 RX ADMIN — PANTOPRAZOLE SODIUM 40 MILLIGRAM(S): 20 TABLET, DELAYED RELEASE ORAL at 12:19

## 2018-03-20 RX ADMIN — ONDANSETRON 8 MILLIGRAM(S): 8 TABLET, FILM COATED ORAL at 05:55

## 2018-03-20 RX ADMIN — PANTOPRAZOLE SODIUM 40 MILLIGRAM(S): 20 TABLET, DELAYED RELEASE ORAL at 06:06

## 2018-03-20 RX ADMIN — MORPHINE SULFATE 2 MILLIGRAM(S): 50 CAPSULE, EXTENDED RELEASE ORAL at 06:33

## 2018-03-20 RX ADMIN — MORPHINE SULFATE 2 MILLIGRAM(S): 50 CAPSULE, EXTENDED RELEASE ORAL at 00:56

## 2018-03-20 RX ADMIN — MORPHINE SULFATE 2 MILLIGRAM(S): 50 CAPSULE, EXTENDED RELEASE ORAL at 00:26

## 2018-03-20 RX ADMIN — HEPARIN SODIUM 5000 UNIT(S): 5000 INJECTION INTRAVENOUS; SUBCUTANEOUS at 06:06

## 2018-03-20 NOTE — DISCHARGE NOTE ADULT - PROVIDER TOKENS
TOKEN:'[id number]',TOKEN:'[id number]',FREE:[LAST:[Dr. Waldrop],PHONE:[(   )    -],FAX:[(   )    -],ADDRESS:[Gastroenterologist]]

## 2018-03-20 NOTE — PROGRESS NOTE ADULT - PROBLEM SELECTOR PROBLEM 1
Linitis plastica

## 2018-03-20 NOTE — DISCHARGE NOTE ADULT - PLAN OF CARE
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    follow up with your gastroenterologist Pt is s/p esophageal stent placement. Low salt diet  Activity as tolerated.  Take all medication as prescribed.  Follow up with your medical doctor for routine blood pressure monitoring at your next visit.  Notify your doctor if you have any of the following symptoms:   Dizziness, Lightheadedness, Blurry vision, Headache, Chest pain, Shortness of breath you do not make enough thyroid hormone  signs & symptoms of low levels of thyroid hormone - tired, getting cold easily, coarse or thin hair, constipation, shortness of breath, swelling, irregular periods  your doctor will do thyroid hormone blood tests at least once a year to monitor if medication dose is adequate  take your thyroid medicine as directed by your doctor & on empty stomach planning - outpatient PET scan and F/up for decision re. systemic treatment once  Had further discussions regarding risk and benefit from chemotherapy.   Still undecided. Will f/u as outpatient for further discussions.   follow up with the Oncologist

## 2018-03-20 NOTE — PROGRESS NOTE ADULT - PROVIDER SPECIALTY LIST ADULT
Gastroenterology
Heme/Onc
Internal Medicine
Gastroenterology
Internal Medicine
Heme/Onc

## 2018-03-20 NOTE — DISCHARGE NOTE ADULT - CARE PROVIDER_API CALL
Donn Chavis), Internal Medicine  [address] [place] [address]  Phone: [phone]  Fax: [phone]    Adrien De Jesus (KUMAR), Hematology; Internal Medicine; Medical Oncology  [address] [place] [address]  Phone: [phone]  Fax: [phone]    Dr. Waldrop,   Gastroenterologist  Phone: (   )    -  Fax: (   )    -

## 2018-03-20 NOTE — DISCHARGE NOTE ADULT - HOSPITAL COURSE
87 yo F sent in by her gastroenterologist (Dr. Waldrop) for ongoing difficulty swallowing and failure to thrive secondary to "narrowing" of the esophagus. Full EGD was unable to be obtained secondary to severity of narrowing. Pt reports about 10 pounds of unintentional weight loss over the last 1 month. Able to swallow solids and liquids but often "choking" with copious amount of non bloody phlegm and does not believe she is able to adequately eat or hydrate.    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   Ct scan c/w metastatic disease, lung mass and pancreatic tail lesion  Pt is s/p esophageal stent placement.  GI recommendations  - Patient has postprocedure chest discomfort from the pressure of self-expandable metal stent.  No fever, tachycardia, dyspnea.   Zofran  8  mg IV PRN.       but avoid nausea/vomiting.  oxycodone prn pain  - soft diet as tolerated.  - Aspiration precautions.  -consulted medical oncology- pt will follow up as out pt for pet scan and possible  therapy.  -htn - c/w home meds  -hypothyroid - c/w synthroid , normal  tsh  Pt stable for discharge with follow ups with her PMD, GI, and ONC

## 2018-03-20 NOTE — PROGRESS NOTE ADULT - PROBLEM SELECTOR PLAN 1
Await further path w/up - Her2 testing  lung nodule could be metastasis or 2nd primary  s/p stent placement - just starting soft diet today, tolerating  small amounts, no vomiting.  Evaluate adequacy of caloric intake  Likely will benefit form chemotherapy or immunotherapy (based on path findings) as good PS and no significant co morbidities  Patient anxious about avoiding side effects of chemo - explained better supportive care now than when her mother/ treated for cancer  Ct home meds  Nutrition support - if adequate po intake and pain control - then discharge   PT evaluation  Discharge planning  planning - outpatient PET scan and F/up for decision re. systemic treatment once  Had further discussions regarding risk and benefit from chemotherapy.   Still undecided. Will f/u as outpatieint for further discussions.

## 2018-03-20 NOTE — DISCHARGE NOTE ADULT - PATIENT PORTAL LINK FT
You can access the [email or web address] Patient Portal, offered by [facility], by registering with the following website: [email or web address]

## 2018-03-20 NOTE — DISCHARGE NOTE ADULT - CARE PLAN
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    follow up with your gastroenterologist  Secondary Diagnosis:	Esophageal stricture  Assessment and plan of treatment:	Pt is s/p esophageal stent placement.  Secondary Diagnosis:	Essential hypertension  Assessment and plan of treatment:	Low salt diet  Activity as tolerated.  Take all medication as prescribed.  Follow up with your medical doctor for routine blood pressure monitoring at your next visit.  Notify your doctor if you have any of the following symptoms:   Dizziness, Lightheadedness, Blurry vision, Headache, Chest pain, Shortness of breath  Secondary Diagnosis:	Hypothyroidism, unspecified type  Assessment and plan of treatment:	you do not make enough thyroid hormone  signs & symptoms of low levels of thyroid hormone - tired, getting cold easily, coarse or thin hair, constipation, shortness of breath, swelling, irregular periods  your doctor will do thyroid hormone blood tests at least once a year to monitor if medication dose is adequate  take your thyroid medicine as directed by your doctor & on empty stomach  Secondary Diagnosis:	Linitis plastica  Assessment and plan of treatment:	planning - outpatient PET scan and F/up for decision re. systemic treatment once  Had further discussions regarding risk and benefit from chemotherapy.   Still undecided. Will f/u as outpatient for further discussions.   follow up with the Oncologist

## 2018-03-20 NOTE — PROGRESS NOTE ADULT - SUBJECTIVE AND OBJECTIVE BOX
Pt is seen and examined  pt is awake and out of bed to chair  Eating better but still small amounts  Complains of  pain in  mid chest and right back since the stent was placed.          MEDICATIONS  (STANDING):  amLODIPine   Tablet 10 milliGRAM(s) Oral at bedtime  heparin  Injectable 5000 Unit(s) SubCutaneous every 12 hours  hydrochlorothiazide 12.5 milliGRAM(s) Oral daily  latanoprost 0.005% Ophthalmic Solution 1 Drop(s) Both EYES at bedtime  levothyroxine 75 MICROGram(s) Oral daily  lisinopril 20 milliGRAM(s) Oral at bedtime  losartan 100 milliGRAM(s) Oral daily  pantoprazole  Injectable 40 milliGRAM(s) IV Push every 12 hours  potassium phosphate / sodium phosphate powder 1 Packet(s) Oral three times a day  sodium chloride 0.9%. 1000 milliLiter(s) (50 mL/Hr) IV Continuous <Continuous>    MEDICATIONS  (PRN):  morphine  - Injectable 2 milliGRAM(s) IV Push every 6 hours PRN Severe Pain (7 - 10)  ondansetron Injectable 8 milliGRAM(s) IV Push every 6 hours PRN Nausea and/or Vomiting      Allergies    No Known Allergies    Intolerances    Vital Signs Last 24 Hrs  T(C): 36.6 (20 Mar 2018 06:02), Max: 36.8 (19 Mar 2018 16:54)  T(F): 97.9 (20 Mar 2018 06:02), Max: 98.2 (19 Mar 2018 16:54)  HR: 85 (20 Mar 2018 06:02) (82 - 86)  BP: 118/81 (20 Mar 2018 06:02) (118/81 - 132/80)  BP(mean): --  RR: 18 (20 Mar 2018 06:02) (18 - 18)  SpO2: 94% (20 Mar 2018 06:02) (94% - 95%)    PHYSICAL EXAM  General: adult in NAD  HEENT:  anicteric sclera, pink conjunctiva  Neck: supple  CV: normal S1/S2 with no murmur rubs or gallops  Lungs: positive air movement b/l ant lungs,clear to auscultation, no wheezes, no rales  Abdomen: soft non-tender non-distended, no hepatosplenomegaly  Ext: no clubbing cyanosis or edema  Skin: no rashes and no petechiae  Neuro: alert and oriented X 4, no focal deficits  LABS:                        13.6   13.18 )-----------( 285      ( 20 Mar 2018 07:57 )             37.9     Mean Cell Volume : 91.3 fl  Mean Cell Hemoglobin : 32.8 pg  Mean Cell Hemoglobin Concentration : 35.9 gm/dL  Auto Neutrophil # : x  Auto Lymphocyte # : x  Auto Monocyte # : x  Auto Eosinophil # : x  Auto Basophil # : x  Auto Neutrophil % : x  Auto Lymphocyte % : x  Auto Monocyte % : x  Auto Eosinophil % : x  Auto Basophil % : x    03-20    139  |  100  |  19  ----------------------------<  129<H>  3.8   |  30  |  0.59    Ca    9.4      20 Mar 2018 07:00  Phos  2.1     03-20  Mg     1.7     03-20    RADIOLOGY & ADDITIONAL STUDIES:

## 2018-03-20 NOTE — PROGRESS NOTE ADULT - SUBJECTIVE AND OBJECTIVE BOX
Patient is a 88y old  Female who presents with a chief complaint of unable to swallow (12 Mar 2018 20:56)      SUBJECTIVE / OVERNIGHT EVENTS:  pain is controlled.  nausea and vomiting is controlled.  pt wants to go home. tolerating half of her diet.    MEDICATIONS  (STANDING):  amLODIPine   Tablet 10 milliGRAM(s) Oral at bedtime  heparin  Injectable 5000 Unit(s) SubCutaneous every 12 hours  hydrochlorothiazide 12.5 milliGRAM(s) Oral daily  lactulose Syrup 20 Gram(s) Oral once  latanoprost 0.005% Ophthalmic Solution 1 Drop(s) Both EYES at bedtime  levothyroxine 75 MICROGram(s) Oral daily  lisinopril 20 milliGRAM(s) Oral at bedtime  losartan 100 milliGRAM(s) Oral daily  pantoprazole  Injectable 40 milliGRAM(s) IV Push every 12 hours  potassium phosphate / sodium phosphate powder 1 Packet(s) Oral three times a day  sodium chloride 0.9%. 1000 milliLiter(s) (50 mL/Hr) IV Continuous <Continuous>    MEDICATIONS  (PRN):  morphine  - Injectable 2 milliGRAM(s) IV Push every 6 hours PRN Severe Pain (7 - 10)  ondansetron Injectable 8 milliGRAM(s) IV Push every 6 hours PRN Nausea and/or Vomiting      Vital Signs Last 24 Hrs  T(C): 36.6 (20 Mar 2018 06:02), Max: 36.8 (19 Mar 2018 16:54)  T(F): 97.9 (20 Mar 2018 06:02), Max: 98.2 (19 Mar 2018 16:54)  HR: 85 (20 Mar 2018 06:02) (82 - 86)  BP: 118/81 (20 Mar 2018 06:02) (118/81 - 132/80)  BP(mean): --  RR: 18 (20 Mar 2018 06:02) (18 - 18)  SpO2: 94% (20 Mar 2018 06:02) (94% - 95%)  CAPILLARY BLOOD GLUCOSE        I&O's Summary    19 Mar 2018 07:01  -  20 Mar 2018 07:00  --------------------------------------------------------  IN: 1060 mL / OUT: 1 mL / NET: 1059 mL    20 Mar 2018 07:01  -  20 Mar 2018 11:03  --------------------------------------------------------  IN: 200 mL / OUT: 0 mL / NET: 200 mL        PHYSICAL EXAM:  GENERAL: NAD, well-developed  HEAD:  Atraumatic, Normocephalic  EYES: EOMI, PERRLA, conjunctiva and sclera clear  NECK: Supple, No JVD  CHEST/LUNG: Clear to auscultation bilaterally; No wheeze  HEART: Regular rate and rhythm; No murmurs, rubs, or gallops  ABDOMEN: Soft, Nontender, Nondistended; Bowel sounds present  EXTREMITIES:  2+ Peripheral Pulses, No clubbing, cyanosis, or edema  PSYCH: AAOx3  NEUROLOGY: non-focal  SKIN: No rashes or lesions    LABS:                        13.6   13.18 )-----------( 285      ( 20 Mar 2018 07:57 )             37.9     03-20    139  |  100  |  19  ----------------------------<  129<H>  3.8   |  30  |  0.59    Ca    9.4      20 Mar 2018 07:00  Phos  2.1     03-20  Mg     1.7     03-20                RADIOLOGY & ADDITIONAL TESTS:    Imaging Personally Reviewed:    Consultant(s) Notes Reviewed:      Care Discussed with Consultants/Other Providers:

## 2018-03-20 NOTE — DISCHARGE NOTE ADULT - MEDICATION SUMMARY - MEDICATIONS TO STOP TAKING
I will STOP taking the medications listed below when I get home from the hospital:    losartan-hydrochlorothiazide 100mg-12.5mg oral tablet  -- 1 tab(s) by mouth once a day    amlodipine-benazepril 5 mg-20 mg oral capsule  -- 1 cap(s) by mouth once a day (in the evening)

## 2018-03-20 NOTE — PROGRESS NOTE ADULT - ASSESSMENT
89 yo F sent in by her gastroenterologist (Dr. Waldrop) for ongoing difficulty swallowing and failure to thrive secondary to "narrowing" of the esophagus. Full EGD was unable to be obtained secondary to severity of narrowing. Pt reports about 10 pounds of unintentional weight loss over the last 1 month. Able to swallow solids and liquids but often "choking" with copious amount of non bloody phlegm and does not believe she is able to adequately eat or hydrate.    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   Ct scan c/w metastatic disease, lung mass and pancreatic tail leision  Pt is s/p esophageal stent placement.  GI recommendations  #1.  Patient has postprocedure chest discomfort from the pressure of self-expandable metal stent.  No fever, tachycardia, dyspnea.   Zofran  8  mg IV PRN.       but avoid nausea/vomiting.  oxycodone prn pain    #2.  soft diet as tolerated.    #3.  If patient develops fever, tachycardia, dyspnea, or other worrisome signs, obtain CXR and page GI.    #4.  Proton pump inhibitor (protonix 40 mg IV q12 hours)    #5.  Aspiration precautions.    -consulted medical oncology  - pt will follow up as out pt for pet scan and poss therapy.    - supplement potassium and phos    htn - c/w home meds    hypothyroid - c/w synthroid , normal  tsh    d/c planning today

## 2018-03-20 NOTE — DISCHARGE NOTE ADULT - MEDICATION SUMMARY - MEDICATIONS TO TAKE
I will START or STAY ON the medications listed below when I get home from the hospital:    oxyCODONE 5 mg oral tablet  -- 1 tab(s) by mouth every 4 hours, As needed, Moderate Pain (4 - 6) MDD:6  -- Indication: For Esophageal stricture    losartan 100 mg oral tablet  -- 1 tab(s) by mouth once a day  -- Indication: For Hypertension    amLODIPine 5 mg oral tablet  -- 1 tab(s) by mouth once a day (in the evening)  -- Indication: For Hypertension    latanoprost 0.005% ophthalmic solution  -- 1 drop(s) in each eye once a day (in the evening)  -- Indication: For Eye gtts    omeprazole 40 mg oral delayed release capsule  -- 1 cap(s) by mouth once a day (in the morning)  -- Indication: For reflux    levothyroxine 75 mcg (0.075 mg) oral tablet  -- 1 tab(s) by mouth once a day (in the morning)  -- Indication: For Hypothyroid

## 2018-03-25 LAB
CULTURE RESULTS: SIGNIFICANT CHANGE UP
CULTURE RESULTS: SIGNIFICANT CHANGE UP
SPECIMEN SOURCE: SIGNIFICANT CHANGE UP
SPECIMEN SOURCE: SIGNIFICANT CHANGE UP

## 2018-03-27 ENCOUNTER — APPOINTMENT (OUTPATIENT)
Dept: GASTROENTEROLOGY | Facility: HOSPITAL | Age: 83
End: 2018-03-27

## 2018-03-29 PROBLEM — E03.9 HYPOTHYROIDISM, UNSPECIFIED: Chronic | Status: ACTIVE | Noted: 2018-03-12

## 2018-03-29 PROBLEM — I10 ESSENTIAL (PRIMARY) HYPERTENSION: Chronic | Status: ACTIVE | Noted: 2018-03-12

## 2018-04-09 PROBLEM — Z86.39 HISTORY OF HYPOTHYROIDISM: Status: RESOLVED | Noted: 2018-04-09 | Resolved: 2018-04-09

## 2018-04-09 PROBLEM — Z87.39 HISTORY OF OSTEOPOROSIS: Status: RESOLVED | Noted: 2018-04-09 | Resolved: 2018-04-09

## 2018-04-09 PROBLEM — Z86.79 HISTORY OF HYPERTENSION: Status: RESOLVED | Noted: 2018-04-09 | Resolved: 2018-04-09

## 2018-04-10 ENCOUNTER — APPOINTMENT (OUTPATIENT)
Dept: THORACIC SURGERY | Facility: CLINIC | Age: 83
End: 2018-04-10
Payer: MEDICARE

## 2018-04-10 VITALS
BODY MASS INDEX: 22.82 KG/M2 | TEMPERATURE: 98.1 F | WEIGHT: 100 LBS | RESPIRATION RATE: 17 BRPM | SYSTOLIC BLOOD PRESSURE: 131 MMHG | OXYGEN SATURATION: 97 % | HEART RATE: 104 BPM | DIASTOLIC BLOOD PRESSURE: 78 MMHG | HEIGHT: 55.5 IN

## 2018-04-10 DIAGNOSIS — Z80.9 FAMILY HISTORY OF MALIGNANT NEOPLASM, UNSPECIFIED: ICD-10-CM

## 2018-04-10 DIAGNOSIS — Z86.39 PERSONAL HISTORY OF OTHER ENDOCRINE, NUTRITIONAL AND METABOLIC DISEASE: ICD-10-CM

## 2018-04-10 DIAGNOSIS — Z82.49 FAMILY HISTORY OF ISCHEMIC HEART DISEASE AND OTHER DISEASES OF THE CIRCULATORY SYSTEM: ICD-10-CM

## 2018-04-10 DIAGNOSIS — Z86.79 PERSONAL HISTORY OF OTHER DISEASES OF THE CIRCULATORY SYSTEM: ICD-10-CM

## 2018-04-10 DIAGNOSIS — Z87.39 PERSONAL HISTORY OF OTHER DISEASES OF THE MUSCULOSKELETAL SYSTEM AND CONNECTIVE TISSUE: ICD-10-CM

## 2018-04-10 DIAGNOSIS — Z80.51 FAMILY HISTORY OF MALIGNANT NEOPLASM OF KIDNEY: ICD-10-CM

## 2018-04-10 PROCEDURE — 99205 OFFICE O/P NEW HI 60 MIN: CPT

## 2018-04-10 RX ORDER — AMLODIPINE BESYLATE 5 MG/1
5 TABLET ORAL
Refills: 0 | Status: ACTIVE | COMMUNITY

## 2018-04-10 RX ORDER — LOSARTAN POTASSIUM 100 MG/1
100 TABLET, FILM COATED ORAL
Refills: 0 | Status: ACTIVE | COMMUNITY

## 2018-04-10 RX ORDER — OMEPRAZOLE 40 MG/1
40 CAPSULE, DELAYED RELEASE ORAL
Refills: 0 | Status: ACTIVE | COMMUNITY

## 2018-04-10 RX ORDER — LEVOTHYROXINE SODIUM 0.07 MG/1
75 TABLET ORAL
Refills: 0 | Status: ACTIVE | COMMUNITY

## 2018-04-17 ENCOUNTER — APPOINTMENT (OUTPATIENT)
Dept: THORACIC SURGERY | Facility: CLINIC | Age: 83
End: 2018-04-17
Payer: MEDICARE

## 2018-04-17 ENCOUNTER — APPOINTMENT (OUTPATIENT)
Dept: GASTROENTEROLOGY | Facility: CLINIC | Age: 83
End: 2018-04-17
Payer: MEDICARE

## 2018-04-17 ENCOUNTER — APPOINTMENT (OUTPATIENT)
Dept: THORACIC SURGERY | Facility: CLINIC | Age: 83
End: 2018-04-17

## 2018-04-17 VITALS
DIASTOLIC BLOOD PRESSURE: 77 MMHG | SYSTOLIC BLOOD PRESSURE: 125 MMHG | HEART RATE: 115 BPM | WEIGHT: 100 LBS | OXYGEN SATURATION: 95 % | BODY MASS INDEX: 22.82 KG/M2 | HEIGHT: 55.5 IN

## 2018-04-17 VITALS
WEIGHT: 100 LBS | BODY MASS INDEX: 22.83 KG/M2 | SYSTOLIC BLOOD PRESSURE: 139 MMHG | RESPIRATION RATE: 16 BRPM | DIASTOLIC BLOOD PRESSURE: 76 MMHG | HEART RATE: 106 BPM | OXYGEN SATURATION: 98 %

## 2018-04-17 DIAGNOSIS — R13.10 DYSPHAGIA, UNSPECIFIED: ICD-10-CM

## 2018-04-17 DIAGNOSIS — K22.2 ESOPHAGEAL OBSTRUCTION: ICD-10-CM

## 2018-04-17 PROCEDURE — 99215 OFFICE O/P EST HI 40 MIN: CPT

## 2018-04-17 PROCEDURE — 99214 OFFICE O/P EST MOD 30 MIN: CPT

## 2018-04-18 PROBLEM — R13.10 DYSPHAGIA: Status: ACTIVE | Noted: 2018-04-10

## 2018-04-18 PROBLEM — K22.2 ESOPHAGEAL STRICTURE: Status: ACTIVE | Noted: 2018-04-10

## 2018-04-25 ENCOUNTER — APPOINTMENT (OUTPATIENT)
Dept: PULMONOLOGY | Facility: CLINIC | Age: 83
End: 2018-04-25
Payer: MEDICARE

## 2018-04-25 PROCEDURE — 94726 PLETHYSMOGRAPHY LUNG VOLUMES: CPT

## 2018-04-25 PROCEDURE — 94729 DIFFUSING CAPACITY: CPT

## 2018-04-25 PROCEDURE — 94010 BREATHING CAPACITY TEST: CPT

## 2018-04-26 ENCOUNTER — APPOINTMENT (OUTPATIENT)
Dept: CARDIOLOGY | Facility: CLINIC | Age: 83
End: 2018-04-26
Payer: MEDICARE

## 2018-04-26 ENCOUNTER — NON-APPOINTMENT (OUTPATIENT)
Age: 83
End: 2018-04-26

## 2018-04-26 VITALS
OXYGEN SATURATION: 98 % | DIASTOLIC BLOOD PRESSURE: 73 MMHG | SYSTOLIC BLOOD PRESSURE: 117 MMHG | HEIGHT: 55.5 IN | WEIGHT: 99 LBS | BODY MASS INDEX: 22.59 KG/M2 | HEART RATE: 95 BPM

## 2018-04-26 DIAGNOSIS — Z01.810 ENCOUNTER FOR PREPROCEDURAL CARDIOVASCULAR EXAMINATION: ICD-10-CM

## 2018-04-26 PROCEDURE — 99205 OFFICE O/P NEW HI 60 MIN: CPT

## 2018-04-26 PROCEDURE — 93000 ELECTROCARDIOGRAM COMPLETE: CPT

## 2018-05-01 PROBLEM — Z01.810 PRE-OPERATIVE CARDIOVASCULAR EXAMINATION: Status: ACTIVE | Noted: 2018-04-26

## 2018-05-02 ENCOUNTER — APPOINTMENT (OUTPATIENT)
Dept: CV DIAGNOSITCS | Facility: HOSPITAL | Age: 83
End: 2018-05-02
Payer: MEDICARE

## 2018-05-02 ENCOUNTER — OUTPATIENT (OUTPATIENT)
Dept: OUTPATIENT SERVICES | Facility: HOSPITAL | Age: 83
LOS: 1 days | End: 2018-05-02

## 2018-05-02 DIAGNOSIS — Z01.810 ENCOUNTER FOR PREPROCEDURAL CARDIOVASCULAR EXAMINATION: ICD-10-CM

## 2018-05-02 DIAGNOSIS — Z98.890 OTHER SPECIFIED POSTPROCEDURAL STATES: Chronic | ICD-10-CM

## 2018-05-02 PROCEDURE — 93306 TTE W/DOPPLER COMPLETE: CPT | Mod: 26

## 2018-05-08 ENCOUNTER — APPOINTMENT (OUTPATIENT)
Dept: THORACIC SURGERY | Facility: CLINIC | Age: 83
End: 2018-05-08
Payer: MEDICARE

## 2018-05-15 ENCOUNTER — APPOINTMENT (OUTPATIENT)
Dept: THORACIC SURGERY | Facility: CLINIC | Age: 83
End: 2018-05-15
Payer: MEDICARE

## 2018-05-15 VITALS
DIASTOLIC BLOOD PRESSURE: 82 MMHG | SYSTOLIC BLOOD PRESSURE: 142 MMHG | OXYGEN SATURATION: 99 % | BODY MASS INDEX: 22.6 KG/M2 | RESPIRATION RATE: 16 BRPM | HEART RATE: 97 BPM | WEIGHT: 99 LBS

## 2018-05-15 PROCEDURE — 99214 OFFICE O/P EST MOD 30 MIN: CPT

## 2018-05-16 RX ORDER — TRAMADOL HYDROCHLORIDE 50 MG/1
50 TABLET, COATED ORAL
Qty: 40 | Refills: 0 | Status: COMPLETED | COMMUNITY
Start: 2018-03-30

## 2018-05-16 RX ORDER — LOSARTAN POTASSIUM AND HYDROCHLOROTHIAZIDE 12.5; 1 MG/1; MG/1
100-12.5 TABLET ORAL
Qty: 90 | Refills: 0 | Status: COMPLETED | COMMUNITY
Start: 2018-02-27

## 2018-05-16 RX ORDER — OXYCODONE 5 MG/1
5 TABLET ORAL
Qty: 40 | Refills: 0 | Status: COMPLETED | COMMUNITY
Start: 2018-03-30

## 2018-05-16 RX ORDER — FLUTICASONE PROPIONATE 50 UG/1
50 SPRAY, METERED NASAL
Qty: 16 | Refills: 0 | Status: COMPLETED | COMMUNITY
Start: 2017-11-28

## 2018-05-16 RX ORDER — AMLODIPINE BESYLATE AND BENAZEPRIL HYDROCHLORIDE 5; 20 MG/1; MG/1
5-20 CAPSULE ORAL
Qty: 90 | Refills: 0 | Status: COMPLETED | COMMUNITY
Start: 2017-11-28

## 2018-05-16 RX ORDER — ALENDRONATE SODIUM 70 MG/1
70 TABLET ORAL
Qty: 12 | Refills: 0 | Status: COMPLETED | COMMUNITY
Start: 2017-11-28

## 2018-05-16 RX ORDER — LATANOPROST/PF 0.005 %
0.01 DROPS OPHTHALMIC (EYE)
Qty: 25 | Refills: 0 | Status: COMPLETED | COMMUNITY
Start: 2017-08-17

## 2018-07-09 ENCOUNTER — FORM ENCOUNTER (OUTPATIENT)
Age: 83
End: 2018-07-09

## 2018-07-10 ENCOUNTER — APPOINTMENT (OUTPATIENT)
Dept: THORACIC SURGERY | Facility: CLINIC | Age: 83
End: 2018-07-10
Payer: MEDICARE

## 2018-07-10 ENCOUNTER — OUTPATIENT (OUTPATIENT)
Dept: OUTPATIENT SERVICES | Facility: HOSPITAL | Age: 83
LOS: 1 days | End: 2018-07-10
Payer: MEDICARE

## 2018-07-10 ENCOUNTER — APPOINTMENT (OUTPATIENT)
Dept: RADIOLOGY | Facility: HOSPITAL | Age: 83
End: 2018-07-10

## 2018-07-10 VITALS
OXYGEN SATURATION: 99 % | DIASTOLIC BLOOD PRESSURE: 75 MMHG | RESPIRATION RATE: 16 BRPM | HEART RATE: 87 BPM | TEMPERATURE: 98.1 F | WEIGHT: 97 LBS | BODY MASS INDEX: 22.14 KG/M2 | SYSTOLIC BLOOD PRESSURE: 131 MMHG

## 2018-07-10 DIAGNOSIS — Z98.890 OTHER SPECIFIED POSTPROCEDURAL STATES: Chronic | ICD-10-CM

## 2018-07-10 DIAGNOSIS — C16.9 MALIGNANT NEOPLASM OF STOMACH, UNSPECIFIED: ICD-10-CM

## 2018-07-10 PROCEDURE — 71046 X-RAY EXAM CHEST 2 VIEWS: CPT | Mod: 26

## 2018-07-10 PROCEDURE — 99213 OFFICE O/P EST LOW 20 MIN: CPT

## 2018-07-10 RX ORDER — HYDROCHLOROTHIAZIDE 12.5 MG/1
12.5 CAPSULE ORAL
Qty: 90 | Refills: 0 | Status: DISCONTINUED | COMMUNITY
Start: 2017-11-28 | End: 2018-07-10

## 2018-07-10 RX ORDER — SUCRALFATE 1 G/10ML
1 SUSPENSION ORAL 3 TIMES DAILY
Qty: 900 | Refills: 3 | Status: ACTIVE | COMMUNITY
Start: 2018-07-10 | End: 1900-01-01

## 2018-07-17 ENCOUNTER — OUTPATIENT (OUTPATIENT)
Dept: OUTPATIENT SERVICES | Facility: HOSPITAL | Age: 83
LOS: 1 days | Discharge: HOME | End: 2018-07-17

## 2018-07-17 ENCOUNTER — APPOINTMENT (OUTPATIENT)
Dept: HEMATOLOGY ONCOLOGY | Facility: CLINIC | Age: 83
End: 2018-07-17

## 2018-07-17 DIAGNOSIS — Z98.890 OTHER SPECIFIED POSTPROCEDURAL STATES: Chronic | ICD-10-CM

## 2018-07-17 DIAGNOSIS — C18.9 MALIGNANT NEOPLASM OF COLON, UNSPECIFIED: ICD-10-CM

## 2018-07-20 ENCOUNTER — APPOINTMENT (OUTPATIENT)
Dept: HEMATOLOGY ONCOLOGY | Facility: CLINIC | Age: 83
End: 2018-07-20
Payer: MEDICARE

## 2018-07-20 VITALS
HEART RATE: 99 BPM | OXYGEN SATURATION: 98 % | TEMPERATURE: 98 F | SYSTOLIC BLOOD PRESSURE: 120 MMHG | BODY MASS INDEX: 21.43 KG/M2 | DIASTOLIC BLOOD PRESSURE: 70 MMHG | HEIGHT: 55 IN | RESPIRATION RATE: 16 BRPM | WEIGHT: 92.59 LBS

## 2018-07-20 DIAGNOSIS — C16.9 MALIGNANT NEOPLASM OF STOMACH, UNSPECIFIED: ICD-10-CM

## 2018-07-20 PROCEDURE — 99204 OFFICE O/P NEW MOD 45 MIN: CPT

## 2018-07-22 PROBLEM — C16.9 GASTRIC CANCER: Status: ACTIVE | Noted: 2018-04-09

## 2018-08-16 NOTE — CHART NOTE - NSCHARTNOTEFT_GEN_A_CORE
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    Medications:  General anesthesia    Attendings:  Dr. Easton Diane, Dr. Mahin Phelan    Fellow:  Dr. Johan Raygoza    Findings:  Long stricture involving the distal esophagus and proximal stomach.  Wire guided fully covered Endomaxx stent placed under endoscopic and fluoroscopic guidance.    Complications/EBL:  None.    Recommendations:    #1.  Patient has postprocedure chest discomfort from the pressure of self-expandable metal stent.  No fever, tachycardia, dyspnea.  Pt given Zofran 4 mg IV, Tylenol 1 gram IV, and Morphine 1 mg IV postprocedure.  Stent will expand over the next 48 hours, and patient tolerance of stent should improve over the next 72 hours.  May use low dose opiates IV, but avoid nausea/vomiting.    #2.  Clear liquid diet    #3.  If patient develops fever, tachycardia, dyspnea, or other worrisome signs, obtain CXR and page GI.    #4.  Proton pump inhibitor (protonix 40 mg IV q12 hours)    #5.  Aspiration precautions. Problem: Non-Pressure Injury Wound  Intervention: # Assess and measure wound every 7 days and if status is deteriorating.  Enter today's date indicating that the wound was measured.  This will produce a worklist task that will fire 7 days from the date entered to repeat the measurement.    Pt stated that he is having 10/10 pain and he stated, \" I have passed out twice in the last week.\" Pt stated that his lowest blood glucose was 38. The provider encouraged him to contact his PCP today. Pt stated the he would.          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    Medications:  General anesthesia    Attendings:  Dr. Easton Diane, Dr. Mahin hPelan    Fellow:  Dr. Johan Raygoza    Findings:  Long stricture involving the distal esophagus and proximal stomach.  Wire guided fully covered Endomaxx stent placed under endoscopic and fluoroscopic guidance.    Complications/EBL:  None.    Recommendations:    #1.  Patient has postprocedure chest discomfort from the pressure of self-expandable metal stent.  No fever, tachycardia, dyspnea.  Pt given Zofran 4 mg IV, Tylenol 1 gram IV, and Morphine 1 mg IV postprocedure.  Stent will expand over the next 48 hours, and patient tolerance of stent should improve over the next 72 hours.  May use low dose opiates IV, but avoid nausea/vomiting.    #2.  Full liquid diet as tolerated.    #3.  If patient develops fever, tachycardia, dyspnea, or other worrisome signs, obtain CXR and page GI.    #4.  Proton pump inhibitor (protonix 40 mg IV q12 hours)    #5.  Aspiration precautions.

## 2018-12-10 NOTE — PROGRESS NOTE ADULT - SUBJECTIVE AND OBJECTIVE BOX
Patient is a 88y old  Female who presents with a chief complaint of unable to swallow (12 Mar 2018 20:56)      SUBJECTIVE / OVERNIGHT EVENTS:  pt is s/p esophageal stent placement.  c/o pain and nausea    MEDICATIONS  (STANDING):  amLODIPine   Tablet 10 milliGRAM(s) Oral at bedtime  heparin  Injectable 5000 Unit(s) SubCutaneous every 12 hours  hydrochlorothiazide 12.5 milliGRAM(s) Oral daily  latanoprost 0.005% Ophthalmic Solution 1 Drop(s) Both EYES at bedtime  levothyroxine 75 MICROGram(s) Oral daily  lisinopril 20 milliGRAM(s) Oral at bedtime  losartan 100 milliGRAM(s) Oral daily  pantoprazole  Injectable 40 milliGRAM(s) IV Push every 12 hours    MEDICATIONS  (PRN):  morphine  - Injectable 2 milliGRAM(s) IV Push every 6 hours PRN Severe Pain (7 - 10)  ondansetron Injectable 4 milliGRAM(s) IV Push every 6 hours PRN Nausea and/or Vomiting      Vital Signs Last 24 Hrs  T(C): 36.7 (16 Mar 2018 17:30), Max: 37.3 (16 Mar 2018 12:11)  T(F): 98 (16 Mar 2018 17:30), Max: 99.2 (16 Mar 2018 12:11)  HR: 94 (17 Mar 2018 06:58) (83 - 105)  BP: 135/74 (17 Mar 2018 05:39) (104/63 - 145/80)  BP(mean): --  RR: 18 (17 Mar 2018 05:39) (18 - 18)  SpO2: 98% (17 Mar 2018 05:39) (95% - 98%)  CAPILLARY BLOOD GLUCOSE        I&O's Summary      PHYSICAL EXAM:  GENERAL: NAD, well-developed  HEAD:  Atraumatic, Normocephalic  EYES: EOMI, PERRLA, conjunctiva and sclera clear  NECK: Supple, No JVD  CHEST/LUNG: Clear to auscultation bilaterally; No wheeze  HEART: Regular rate and rhythm; No murmurs, rubs, or gallops  ABDOMEN: Soft, Nontender, Nondistended; Bowel sounds present  EXTREMITIES:  2+ Peripheral Pulses, No clubbing, cyanosis, or edema  PSYCH: AAOx3  NEUROLOGY: non-focal  SKIN: No rashes or lesions    LABS:    03-17    137  |  96  |  12  ----------------------------<  153<H>  3.4<L>   |  22  |  0.63    Ca    9.6      17 Mar 2018 05:54  Mg     1.5     03-16                RADIOLOGY & ADDITIONAL TESTS:    Imaging Personally Reviewed:    Consultant(s) Notes Reviewed:      Care Discussed with Consultants/Other Providers: Refill request for    Name from pharmacy: [facility] 10MG TABLETS          Will file in chart as: busPIRone (BUS[facility]) 10 MG tablet    The source prescription was discontinued on 12/14/2017 by Zina Yu RN for the following reason: Reorder.    Sig: TAKE 1 TABLET BY MOUTH AT 7 AM AND AT 4 PM    Disp:  180 tablet    Refills:  0    Start: 12/8/2018    Class: E-Prescribe    For: Paralysis agitans (H)

## 2020-01-01 NOTE — ED PROVIDER NOTE - GASTROINTESTINAL NEGATIVE STATEMENT, MLM
(+) dyphagia; no abdominal pain, no bloating, no constipation, no diarrhea, no nausea and no vomiting. [phone]

## 2021-10-11 NOTE — ED ADULT NURSE NOTE - NS ED PATIENT SAFETY CONCERN
Seble Gagnon is a 31 year old female presenting for wt management    Medications verified, no changes. Pharmacy verified.     Refills needed today: pended    Allergies verified. Denies known Latex allergy or symptoms of Latex sensitivity.    Social History     Tobacco Use   Smoking Status Never Smoker   Smokeless Tobacco Current User       Health Maintenance Due   Topic Date Due   • COVID-19 Vaccine (2 - Pfizer 2-dose series) 08/23/2021   • Influenza Vaccine (1) 09/01/2021       Patient is due for topics as listed above but is not proceeding with Immunization(s) Influenza at this time.           Patient would like communication of their results via:        Cell Phone:   Telephone Information:   Mobile [phone]     Okay to leave a message containing results? Yes         No

## 2023-07-20 NOTE — PROGRESS NOTE ADULT - SUBJECTIVE AND OBJECTIVE BOX
PATIENT: LESLI OSMAN   AGE: 87yo   GENDER: Female  WEIGHT: Height (cm): 139.7 (03-13-18 @ 19:30)  Weight (kg): 45 (03-13-18 @ 19:30)  BMI (kg/m2): 23.1 (03-13-18 @ 19:30)  BSA (m2): 1.3 (03-13-18 @ 19:30)  ALLERGIES: No Known Allergies    CHIEF COMPLAINT:   unable to swallow (12 Mar 2018 20:56)    INTERVAL HPI / OVERNIGHT EVENTS:       MEDICATIONS  amLODIPine   Tablet 5 milliGRAM(s) Oral daily  heparin  Injectable 5000 Unit(s) SubCutaneous every 12 hours  hydrochlorothiazide 12.5 milliGRAM(s) Oral daily  latanoprost 0.005% Ophthalmic Solution 1 Drop(s) Both EYES at bedtime  levothyroxine 75 MICROGram(s) Oral daily  lisinopril 20 milliGRAM(s) Oral daily  losartan 100 milliGRAM(s) Oral daily  pantoprazole  Injectable 40 milliGRAM(s) IV Push daily  sodium chloride 0.9%. 1000 milliLiter(s) (75 mL/Hr) IV Continuous <Continuous>      VITAL SIGNS (last 24 hrs):  ICU Vital Signs Last 24 Hrs  T(C): 36.6 (14 Mar 2018 09:06), Max: 36.6 (13 Mar 2018 19:30)  T(F): 97.8 (14 Mar 2018 09:06), Max: 97.8 (13 Mar 2018 19:30)  HR: 94 (14 Mar 2018 09:06) (81 - 94)  BP: 159/80 (14 Mar 2018 09:06) (105/61 - 159/80)  BP(mean): --  ABP: --  ABP(mean): --  RR: 18 (14 Mar 2018 09:06) (16 - 18)  SpO2: 95% (14 Mar 2018 09:06) (95% - 99%)    T(C): 36.6 (03-14-18 @ 09:06), Max: 36.6 (03-13-18 @ 19:30)  T(F): 97.8 (03-14-18 @ 09:06), Max: 97.8 (03-13-18 @ 19:30)  HR: 94 (03-14-18 @ 09:06) (81 - 94)  BP: 159/80 (03-14-18 @ 09:06) (105/61 - 159/80)  BP(mean): --  RR: 18 (03-14-18 @ 09:06) (16 - 18)  SpO2: 95% (03-14-18 @ 09:06) (95% - 99%)    PHYSICAL EXAM:  GENERAL: NAD  HEENT:  AT/NC; EOMI, PERRLA, conjunctiva and sclera clear; hearing grossly intact  NECK: Supple, non tender  CHEST/LUNG: CTAB, no wheezes, ronchi, rales  HEART: Normal rate, regular rhythm; No murmurs, rubs, or gallops; No JVD or peripheral edema  ABDOMEN: soft, NTTP, nondistended, normoactive BS  MSK/EXTREMITIES:  Grossly normal strength, movement, tone, and ROM; Palpable peripheral pulses; No clubbing or cyanosis  PSYCH: AAOx4  NEUROLOGY: Non focal   SKIN: No rashes or lesions      LABS:                         12.9   7.07  >-----< 261           ( 03-13-18 @ 07:31 )             38.1       Ca   8.9   (03-13-18 @ 05:36)  Mg   1.6   (03-13-18 @ 05:36)  Phos 2.7   (03-13-18 @ 05:36)       TP 8.9     |  AST x     -------------------------     Alb x     |  ALT x               (03-13-18 @ 05:36)  -------------------------     T-bili x   |  AlkPh x     D-bili x   COAGULATION STUDIES:     aPTT  30.6 sec    (03-12-18 @ 15:25)     PT     11.6 sec    (03-12-18 @ 15:25)     INR    1.06 ratio         (03-12-18 @ 15:25)     POCT Blood Glucose.: 74 mg/dL (03-14-18 @ 08:20)  POCT Blood Glucose.: 114 mg/dL (03-13-18 @ 23:10)  POCT Blood Glucose.: 65 mg/dL (03-13-18 @ 22:38)  POCT Blood Glucose.: 70 mg/dL (03-13-18 @ 22:01)  POCT Blood Glucose.: 68 mg/dL (03-13-18 @ 22:00)      RADIOLOGY & ADDITIONAL TESTS:    < from: Upper Endoscopy (03.13.18 @ 16:28) >    [facility]  ____________________________________________________________________________________________________  Patient Name: Lesli Osman                     MRN: [id number]  Account Number: [id number]                     YOB: 1929  Room: Endoscopy Room 3                           Gender: Female  Attending MD: Mahin Phelan MD                    Procedure Date No Time: 3/13/2018  ____________________________________________________________________________________________________     Procedure:           Upper GI endoscopy  Indications:         Dysphagia x 2 week  Providers:           Mahin Phelan MD, Surinder Smith (Fellow)  Requesting Provider: SHAY BASHIR  Medicines:           Monitored Anesthesia Care  Complications:       No immediate complications.  ____________________________________________________________________________________________________  Procedure:           Pre-Anesthesia Assessment:                       - Monitored anesthesia care under the supervision of an anesthesiologist was                        determined to be medically necessary for this procedure based on review of                        the patient's medical history, medications, and prior anesthesia history.                       After obtaining informed consent, the endoscope was passed under direct                        vision. Throughout the procedure, the patient's blood pressure, pulse, and                        oxygen saturations were monitoredcontinuously. The Endoscope was introduced                        through the mouth, and advanced to the second part of duodenum. The Endoscope                        was introduced through the mouth, and advanced to the lower third of           esophagus. The upper GI endoscopy was accomplished without difficulty. The                        patient tolerated the procedure well.                                                                                                        Findings:       An area of stenosis was found 35 cm from the incisors at the GE junction. This could not be        traversed with a standard upper endoscope. A transnasal scope was used to traverse the        stenosis.       The exam of the esophagus wasotherwise normal.       Mucosal changes characterized by congestion, erythema and nodularity were found in the        cardia, in the gastric fundus and in the gastric body. The abnormal area spanned 35cm to        42cm. Biopsies were taken with a cold forceps for histology. Estimated blood loss: none.       The exam of the stomach was otherwise normal.       A diverticulum was found in the second part of the duodenum.       The exam of the duodenum was otherwise normal.                                                                              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 Lumen is                        approximately 5-6 mm in diameter.                       - 6-7cm long segment of congested, erythematous and nodular mucosa in the                        cardia, gastric fundus and proximal gastric body. Appearanceis suspicious                        for linitus plastica or lymphoma. Biopsied.                       - Duodenal diverticulum.  Recommendation:      - [facility].                       - Await pathology results.                       - Obtain a CT of the chest abdomen and pelvis with IV contrast for staging                        purposes                       - The patient states she can tolerate liquids, careful trial of liquid diet                        with high calorie supplements. Nutrition evaluation.                       - Discussed with Dr. Bashir.                                                                                                        Attending Participation:       I was present and participated during the entire procedure, including non-key portions.                                                                                                          ______________  Mahin Phelan MD  3/13/2018 7:14:58 PM  Number of Addenda:0    Note Initiated On: 3/13/2018 4:28 PM    < end of copied text > [Home] : at home, [unfilled] , at the time of the visit. [Other Location: e.g. Home (Enter Location, City,State)___] : at [unfilled] [Verbal consent obtained from patient] : the patient, [unfilled] [Sexually Active] : The patient is sexually active [Monogamous] : monogamous [Condom Use] : using condoms [Condom Use - All Encounters] : for every encounter [Women] : with women [Female ___] : [unfilled] female [FreeTextEntry1] : Jay Jay returns for HIV follow-up via telephonic visit, which he requested in lieu of his scheduled in person visit last week, which had been rescheduled as an in-person visit for today.\par \par Jay Jay has been taking Biktarvy without any difficulty or missed doses.  He has no new HIV-related issues to report.\par \par He states that he is doing very well despite a recent diagnosis and treatment for toe osteomyelitis.  \par Jay Jay states that he is following up with his podiatrist, Dr. Jalloh every 2 weeks, and that his feet are healing well.\par \par Jay Jay reports that his chronic lower extremity and back pain has been relatively well-controlled on his current medical regimen.\par \par He also states that he intends to follow-up with his cardiologists next week. [de-identified] : None recently [de-identified] : Not working presently [de-identified] : Negative [de-identified] : Partner [de-identified] : Partner

## 2024-08-30 NOTE — ED ADULT NURSE NOTE - TEMPLATE
CBC Full  -  ( 30 Aug 2024 10:19 )  WBC Count : 3.17 K/uL  RBC Count : 1.24 M/uL  Hemoglobin : 4.2 g/dL  Hematocrit : 15.0 %  Platelet Count - Automated : 26 K/uL  Mean Cell Volume : 121.0 fL  Mean Cell Hemoglobin : 33.9 pg  Mean Cell Hemoglobin Concentration : 28.0 gm/dL  Auto Neutrophil # : 1.81 K/uL  Auto Lymphocyte # : 0.92 K/uL  Auto Monocyte # : 0.35 K/uL  Auto Eosinophil # : 0.03 K/uL  Auto Basophil # : 0.03 K/uL  Auto Neutrophil % : 56.0 %  Auto Lymphocyte % : 29.0 %  Auto Monocyte % : 11.0 %  Auto Eosinophil % : 1.0 %  Auto Basophil % : 1.0 %    08-30    157<H>  |  79<L>  |  150<H>  ----------------------------<  144<H>  4.5   |  14<L>  |  11.40<H>    Ca    10.7<H>      30 Aug 2024 13:59  Phos  5.4     08-30  Mg     5.30     08-30    TPro  6.1  /  Alb  2.7<L>  /  TBili  <0.2  /  DBili  x   /  AST  10  /  ALT  9   /  AlkPhos  359  08-30
General